# Patient Record
Sex: FEMALE | Race: WHITE | Employment: PART TIME | URBAN - METROPOLITAN AREA
[De-identification: names, ages, dates, MRNs, and addresses within clinical notes are randomized per-mention and may not be internally consistent; named-entity substitution may affect disease eponyms.]

---

## 2017-02-14 ENCOUNTER — TELEPHONE (OUTPATIENT)
Dept: INTERNAL MEDICINE CLINIC | Age: 37
End: 2017-02-14

## 2017-03-16 ENCOUNTER — OFFICE VISIT (OUTPATIENT)
Dept: INTERNAL MEDICINE CLINIC | Age: 37
End: 2017-03-16

## 2017-03-16 VITALS
BODY MASS INDEX: 27.18 KG/M2 | RESPIRATION RATE: 16 BRPM | HEART RATE: 60 BPM | DIASTOLIC BLOOD PRESSURE: 70 MMHG | WEIGHT: 153.4 LBS | HEIGHT: 63 IN | SYSTOLIC BLOOD PRESSURE: 110 MMHG

## 2017-03-16 DIAGNOSIS — G43.909 MIGRAINE WITHOUT STATUS MIGRAINOSUS, NOT INTRACTABLE, UNSPECIFIED MIGRAINE TYPE: ICD-10-CM

## 2017-03-16 DIAGNOSIS — Z00.00 ANNUAL PHYSICAL EXAM: Primary | ICD-10-CM

## 2017-03-16 DIAGNOSIS — F41.9 ANXIETY: ICD-10-CM

## 2017-03-16 PROCEDURE — 99395 PREV VISIT EST AGE 18-39: CPT | Performed by: INTERNAL MEDICINE

## 2017-03-16 ASSESSMENT — PATIENT HEALTH QUESTIONNAIRE - PHQ9
2. FEELING DOWN, DEPRESSED OR HOPELESS: 0
1. LITTLE INTEREST OR PLEASURE IN DOING THINGS: 0
SUM OF ALL RESPONSES TO PHQ9 QUESTIONS 1 & 2: 0
SUM OF ALL RESPONSES TO PHQ QUESTIONS 1-9: 0

## 2017-03-16 ASSESSMENT — ENCOUNTER SYMPTOMS
RESPIRATORY NEGATIVE: 1
EYES NEGATIVE: 1

## 2018-04-02 RX ORDER — SUMATRIPTAN 50 MG/1
TABLET, FILM COATED ORAL
Qty: 9 TABLET | Refills: 5 | Status: SHIPPED | OUTPATIENT
Start: 2018-04-02 | End: 2019-04-01 | Stop reason: SDUPTHER

## 2018-04-26 ENCOUNTER — OFFICE VISIT (OUTPATIENT)
Dept: INTERNAL MEDICINE CLINIC | Age: 38
End: 2018-04-26

## 2018-04-26 VITALS
WEIGHT: 170.6 LBS | RESPIRATION RATE: 16 BRPM | HEART RATE: 60 BPM | DIASTOLIC BLOOD PRESSURE: 80 MMHG | BODY MASS INDEX: 30.23 KG/M2 | SYSTOLIC BLOOD PRESSURE: 130 MMHG | HEIGHT: 63 IN

## 2018-04-26 DIAGNOSIS — Z00.00 ANNUAL PHYSICAL EXAM: Primary | ICD-10-CM

## 2018-04-26 DIAGNOSIS — F41.9 ANXIETY: ICD-10-CM

## 2018-04-26 PROCEDURE — 99395 PREV VISIT EST AGE 18-39: CPT | Performed by: INTERNAL MEDICINE

## 2018-04-26 RX ORDER — VALACYCLOVIR HYDROCHLORIDE 1 G/1
TABLET, FILM COATED ORAL
Qty: 12 TABLET | Refills: 5 | Status: SHIPPED | OUTPATIENT
Start: 2018-04-26 | End: 2020-07-07

## 2018-04-26 ASSESSMENT — ENCOUNTER SYMPTOMS
WHEEZING: 0
BLOOD IN STOOL: 0
RESPIRATORY NEGATIVE: 1
COUGH: 0
SHORTNESS OF BREATH: 0

## 2018-04-26 ASSESSMENT — PATIENT HEALTH QUESTIONNAIRE - PHQ9
SUM OF ALL RESPONSES TO PHQ QUESTIONS 1-9: 0
2. FEELING DOWN, DEPRESSED OR HOPELESS: 0
SUM OF ALL RESPONSES TO PHQ9 QUESTIONS 1 & 2: 0
1. LITTLE INTEREST OR PLEASURE IN DOING THINGS: 0

## 2018-04-28 LAB — VZV IGG SER QL IA: 60 IV

## 2018-05-10 ENCOUNTER — NURSE ONLY (OUTPATIENT)
Dept: INTERNAL MEDICINE CLINIC | Age: 38
End: 2018-05-10

## 2018-05-10 DIAGNOSIS — Z23 NEED FOR VARICELLA VACCINE: Primary | ICD-10-CM

## 2018-05-10 LAB
CONTROL: NORMAL
PREGNANCY TEST URINE, POC: NORMAL

## 2018-05-10 PROCEDURE — 81025 URINE PREGNANCY TEST: CPT | Performed by: INTERNAL MEDICINE

## 2018-05-10 PROCEDURE — 90736 HZV VACCINE LIVE SUBQ: CPT | Performed by: INTERNAL MEDICINE

## 2018-05-10 PROCEDURE — 90471 IMMUNIZATION ADMIN: CPT | Performed by: INTERNAL MEDICINE

## 2018-05-22 LAB
CHOLESTEROL, TOTAL: 171 MG/DL
CHOLESTEROL/HDL RATIO: NORMAL
HDLC SERPL-MCNC: 37 MG/DL (ref 35–70)
LDL CHOLESTEROL CALCULATED: 100 MG/DL (ref 0–160)
TRIGL SERPL-MCNC: 171 MG/DL
VLDLC SERPL CALC-MCNC: NORMAL MG/DL

## 2018-06-14 ENCOUNTER — NURSE ONLY (OUTPATIENT)
Dept: INTERNAL MEDICINE CLINIC | Age: 38
End: 2018-06-14

## 2018-06-14 DIAGNOSIS — Z23 NEED FOR VARICELLA VACCINE: Primary | ICD-10-CM

## 2018-06-14 LAB
CONTROL: PRESENT
PREGNANCY TEST URINE, POC: NEGATIVE

## 2018-06-14 PROCEDURE — 90471 IMMUNIZATION ADMIN: CPT | Performed by: INTERNAL MEDICINE

## 2018-06-14 PROCEDURE — 81025 URINE PREGNANCY TEST: CPT | Performed by: INTERNAL MEDICINE

## 2018-06-14 PROCEDURE — 90716 VAR VACCINE LIVE SUBQ: CPT | Performed by: INTERNAL MEDICINE

## 2018-06-25 ENCOUNTER — TELEPHONE (OUTPATIENT)
Dept: INTERNAL MEDICINE CLINIC | Age: 38
End: 2018-06-25

## 2018-06-25 DIAGNOSIS — R76.0 HIGH SERUM VARICELLA ZOSTER VIRUS (VZV) ANTIBODY TITER: Primary | ICD-10-CM

## 2019-04-02 RX ORDER — SUMATRIPTAN 50 MG/1
TABLET, FILM COATED ORAL
Qty: 18 TABLET | Refills: 4 | Status: SHIPPED | OUTPATIENT
Start: 2019-04-02 | End: 2020-07-07

## 2019-06-04 NOTE — TELEPHONE ENCOUNTER
Called patient and left a VM message stating that an appointment is due before more refills will be granted.

## 2020-08-27 ENCOUNTER — HOSPITAL ENCOUNTER (OUTPATIENT)
Dept: WOMENS IMAGING | Age: 40
Discharge: HOME | End: 2020-08-27
Payer: COMMERCIAL

## 2020-08-27 PROCEDURE — 77063 BREAST TOMOSYNTHESIS BI: CPT

## 2020-12-03 ENCOUNTER — OFFICE VISIT (OUTPATIENT)
Dept: ORTHOPEDIC SURGERY | Age: 40
End: 2020-12-03
Payer: COMMERCIAL

## 2020-12-03 VITALS — HEIGHT: 63 IN | BODY MASS INDEX: 27.29 KG/M2 | TEMPERATURE: 96.9 F | WEIGHT: 154 LBS | RESPIRATION RATE: 12 BRPM

## 2020-12-03 PROCEDURE — 99202 OFFICE O/P NEW SF 15 MIN: CPT | Performed by: ORTHOPAEDIC SURGERY

## 2020-12-03 RX ORDER — PRENATAL VIT/IRON FUM/FOLIC AC 27MG-0.8MG
1 TABLET ORAL DAILY
COMMUNITY
End: 2022-10-31

## 2020-12-03 ASSESSMENT — ENCOUNTER SYMPTOMS
RESPIRATORY NEGATIVE: 1
ALLERGIC/IMMUNOLOGIC NEGATIVE: 1
EYES NEGATIVE: 1
NAUSEA: 1

## 2020-12-03 NOTE — PROGRESS NOTES
Subjective:      Patient ID: Roxene Babinski is a 47 y.o. female. HPI  Roxene Babinski is seen today for evaluation of left shoulder pain. In September she was digging a hole for a new hot tub and felt severe, immediate, excruciating pain in the left shoulder. Her pain is improved with rest.  It is now 2 out of 10 when it used to be 7 out of 10. She still gets some pain with pulling activities such as pulling up her pants. She recently found out she was pregnant so she cannot take anti-inflammatory. She is right-hand dominant. She is otherwise healthy. Review of Systems   Constitutional: Negative. HENT: Negative. Eyes: Negative. Respiratory: Negative. Cardiovascular: Negative. Gastrointestinal: Positive for nausea. Endocrine: Negative. Genitourinary: Negative. Musculoskeletal: Negative. Skin: Negative. Allergic/Immunologic: Negative. Neurological: Positive for headaches. Frequent migraines   Hematological: Bruises/bleeds easily. Psychiatric/Behavioral: Negative. Objective:   Physical Exam  History: Patient's relevant past family, medical, and social history are reviewed as part of today's visit. ROS of pertinent positives and negatives as above; otherwise negative. General Exam:    Vitals: Temperature 96.9 °F (36.1 °C), temperature source Infrared, resp. rate 12, height 5' 3\" (1.6 m), weight 154 lb (69.9 kg), not currently breastfeeding. Constitutional: Patient is adequately groomed with no evidence of malnutrition  Mental Status: The patient is oriented to time, place and person. The patient's mood and affect are appropriate. Gait:  Patient walks with normal gait and station. Lymphatic: The lymphatic examination bilaterally reveals all areas to be without enlargement or induration. Vascular: Examination reveals no swelling or calf tenderness. Peripheral pulses are palpable and 2+. Neurological: The patient has good coordination.   There is no weakness or sensory deficit. Skin:    Head/Neck: inspection reveals no rashes, ulcerations or lesions. Trunk:  inspection reveals no rashes, ulcerations or lesions. Right Lower Extremity: inspection reveals no rashes, ulcerations or lesions. Left Lower Extremity: inspection reveals no rashes, ulcerations or lesions. Examination of the cervical spine reveals no restriction in motion. There are no reproduction of symptoms into either arm with flexion, extension, rotation or palpation. The patient has a negative Spurling sign, and no tenderness. Examination of the right shoulder reveals normal scapular control and no prominence. There is no pain over the acromioclavicular or sternoclavicular joints. The patient has no biceps pain. There is full range of motion. There is no pain with impingement testing. There is no pain with Lemos maneuver. Marshall's maneuver is normal.  There is no pain or apprehension in the abducted externally rotated position. There is no sulcus sign. There is no instability with anterior or posterior stress applied. The patient demonstrates full strength in the supraspinatus, infraspinatus, and subscapularis. Neurologic and vascular examination of the upper extremity  is normal.    Left shoulder exam has some tenderness anteriorly over the long head biceps and subscapularis. Neurologic and vascular exams are normal.  She has no instability. She has mild tightness in internal rotation. He has no pain with impingement maneuver but mild pain to moderate pain with Lemos maneuver. She maintains full strength in the cuff but has discomfort stressing it in all planes. Because she is in the first trimester of her pregnancy I did not obtain x-rays today. Assessment:      Left shoulder strain      Plan:      Because she is pregnant, I do not feel comfortable providing injections or medications.   I am recommending evaluation by the therapist with an emphasis on a

## 2020-12-08 ENCOUNTER — HOSPITAL ENCOUNTER (OUTPATIENT)
Dept: PHYSICAL THERAPY | Age: 40
Setting detail: THERAPIES SERIES
Discharge: HOME | End: 2020-12-08
Payer: COMMERCIAL

## 2020-12-08 PROCEDURE — 97161 PT EVAL LOW COMPLEX 20 MIN: CPT | Performed by: PHYSICAL THERAPIST

## 2020-12-08 PROCEDURE — 97110 THERAPEUTIC EXERCISES: CPT | Performed by: PHYSICAL THERAPIST

## 2020-12-08 PROCEDURE — 97530 THERAPEUTIC ACTIVITIES: CPT | Performed by: PHYSICAL THERAPIST

## 2020-12-08 NOTE — PLAN OF CARE
[facility] [address], [address] [place], [address]  Phone: [phone]   Fax: [phone]          Physical Therapy Certification    Dear Referring Practitioner: Rohan Bryan,    We had the pleasure of evaluating the following patient for physical therapy services at [address] [place] [address]. A summary of our findings can be found in the initial assessment below. This includes our plan of care. If you have any questions or concerns regarding these findings, please do not hesitate to contact me at the office phone number checked above. Thank you for the referral.       Physician Signature:_______________________________Date:__________________  By signing above (or electronic signature), therapists plan is approved by physician      Patient: Clau Peterson   : 1980   MRN: [id number]  Referring Physician: Referring Practitioner: Mahesh      Evaluation Date: 2020      Medical Diagnosis Information:  Diagnosis: left shoulder scapular dyskinesis and IR tightness; left shoulder pain M25.512   Treatment Diagnosis: left shoulder pain-M25.512                                           Precautions/ Contra-indications: anxiety- PCP is aware, depression- PCP is aware, currently pregnant, migraines that are stress induced or hormonal.  C-SSRS Triggered by Intake questionnaire (Past 2 wk assessment):   [x] No, Questionnaire did not trigger screening.   [] Yes, Patient intake triggered further evaluation      [] C-SSRS Screening completed  [] PCP notified via Plan of Care  [] Emergency services notified   Latex Allergy:  [x]NO      []YES  Preferred Language for Healthcare:   [x]English       []other:    SUBJECTIVE: Patient stated complaint: Pt reports hurting her shoulder in September. She was landscaping as they got a hot tub and digging. She had immediate pain. She rested it, and it seemed to get better.   However, recently, her pain got a lot worse. She has pain with pulling on a pair of pants, reaching behind her back, and shutting a door. Pt is RHD. It was bothering her to sleep. She sleeps on that shoulder too. Relevant Medical History: anxiety- PCP is aware, depression- PCP is aware, currently pregnant (6 weeks), migraines that are stress induced or hormonal.  Functional Scale: UEFI-86.25%    Pain Scale: 2-3/10  Easing factors: rest, exercises- 1# wt exercises, Advil before. She was given a steroid, but she found out she was pregnant, and she didn't take it. Pain at best 2-3/10  Provocative factors: 8/10 a couple weeks ago, but recently 5/10. She has pain with pulling on a pair of pants, reaching behind her back, and shutting a door. 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 Living Status/Prior Level of Function: Independent with ADLs and IADLs,  Spinning 2-3 d/wk with some arm, some light strength 1x/wk, run/walk. She normally works out 3-5 d/wk.     (insert highest prior level of function)    OBJECTIVE:     CERV ROM All WNL bilaterally without radiating symptoms    Cervical Flexion     Cervical Extension     Cervical SB     Cervical rotation         ROM PROM AROM  Comment    L R L R    Flexion   165 pain 176    Abduction   180 185    ER   68 98    IR   62 49    Other        Other             Strength L R Comment   Flexion 4- pain 4+    Abduction 4- pain 4+    ER 4- pain 4    IR 4 4+    Supraspinatus      Upper Trap      Lower Trap      Mid Trap      Rhomboids      Biceps 4+ 4+    Triceps      Horizontal Abduction      Horizontal Adduction      Lats Special Tests Left Right   Apley Scratch IR:  ER:   Cross body: IR:  ER:  Cross Body:   Neer's     Full Can     Empty Can     Maxi Platts Positive. Along with CBA    Nerve Tension Testing     Speed's Positive     Sharma's      Spurling's     Repeated Scaption                Reflexes/Sensation (myotomes/dermatomes): sensation intact to light touch    Joint mobility:    []Normal    [x]Hypo   []Hyper    Palpation: TTP over biceps insertion    Functional Mobility/Transfers: see above    Posture: FWD head, scapular protraction    Bandages/Dressings/Incisions: NA    Gait:  WNL    Orthopedic Special Tests: see above                       [x] Patient history, allergies, meds reviewed. Medical chart reviewed. See intake form. Review Of Systems (ROS):  [x]Performed Review of systems (Integumentary, CardioPulmonary, Neurological) by intake and observation. Intake form has been scanned into medical record. Patient has been instructed to contact their primary care physician regarding ROS issues if not already being addressed at this time.       Co-morbidities/Complexities (which will affect course of rehabilitation):   []None           Arthritic conditions   []Rheumatoid arthritis (M05.9)  []Osteoarthritis (M19.91)   Cardiovascular conditions   []Hypertension (I10)  []Hyperlipidemia (E78.5)  []Angina pectoris (I20)  []Atherosclerosis (I70)   Musculoskeletal conditions   []Disc pathology   []Congenital spine pathologies   []Prior surgical intervention  []Osteoporosis (M81.8)  []Osteopenia (M85.8)   Endocrine conditions   []Hypothyroid (E03.9)  []Hyperthyroid Gastrointestinal conditions   []Constipation (U10.56)   Metabolic conditions   []Morbid obesity (E66.01)  []Diabetes type 1(E10.65) or 2 (E11.65)   []Neuropathy (G60.9)     Pulmonary conditions   []Asthma (J45)  []Coughing   []COPD (J44.9)   Psychological Disorders  [x]Anxiety (F41.9)  [x]Depression (F32.9)   []Other:   [x]Other: currently pregnant       Barriers to/and or personal factors that will affect rehab potential:              []Age  []Sex              []Motivation/Lack of Motivation                        []Co-Morbidities              []Cognitive Function, education/learning barriers              []Environmental, home barriers              [x]profession/work barriers  []past PT/medical experience  []other:  Justification: Pt has had shoulder pain for months. It did get better, but it returned and was significant. She is currently pregnant and has to avoid certain tx options. She does sit for the vast majority of the day when she is working, which may contribute to her posture. We did discuss this and ways to work on her posture when working. Falls Risk Assessment (30 days):   [x] Falls Risk assessed and no intervention required.   [] Falls Risk assessed and Patient requires intervention due to being higher risk   TUG score (>12s at risk):     [] Falls education provided, including           ASSESSMENT:   Functional Impairments   []Noted spinal or UE joint hypomobility   []Noted spinal or UE joint hypermobility   [x]Decreased UE functional ROM   [x]Decreased UE functional strength   []Abnormal reflexes/sensation/myotomal/dermatomal deficits   [x]Decreased RC/scapular/core strength and neuromuscular control   []other:      Functional Activity Limitations (from functional questionnaire and intake)   [x]Reduced ability to tolerate prolonged functional positions   []Reduced ability or difficulty with changes of positions or transfers between positions   [x]Reduced ability to maintain good posture and demonstrate good body mechanics with sitting, bending, and lifting   [] Reduced ability or tolerance with driving and/or computer work   [x]Reduced ability to sleep   [x]Reduced ability to perform lifting, reaching, carrying tasks   []Reduced ability to tolerate impact through UE   [x]Reduced ability to reach behind back   []Reduced ability to  or hold objects   []Reduced ability to throw or toss an object   []other:    Participation Restrictions   [x]Reduced participation in self care activities   [x]Reduced participation in home management activities   []Reduced participation in work activities   []Reduced participation in social activities. [x]Reduced participation in sport/recreation activities. Classification:   []Signs/symptoms consistent with post-surgical status including decreased ROM, strength and function. []Signs/symptoms consistent with joint sprain/strain   []Signs/symptoms consistent with shoulder impingement   []Signs/symptoms consistent with shoulder/elbow/wrist tendinopathy   []Signs/symptoms consistent with Rotator cuff tear   []Signs/symptoms consistent with labral tear   []Signs/symptoms consistent with postural dysfunction    []Signs/symptoms consistent with Glenohumeral IR Deficit - <45 degrees   []Signs/symptoms consistent with facet dysfunction of cervical/thoracic spine    []Signs/symptoms consistent with pathology which may benefit from Dry needling     []other: Pt is a 48 y/o female presenting with diagnosis of left scapular dyskinesis and impingement from the MD.  Clinically, the pt presents with decreased ROM, decreased strength, decreased function, and increased pain consistent with the MD diagnosis. The pt would benefit from skilled PT to return to PLOF. Pt has had shoulder pain for months. It did get better, but it returned and was significant. She does demo some biceps irritation. She is currently pregnant and has to avoid certain tx options. She does sit for the vast majority of the day when she is working, which may contribute to her posture. We did discuss this and ways to work on her posture when working. We did review insurance benefits. All of pt's questions were answered. Pt was agreeable.         Prognosis/Rehab Potential:      []Excellent   [x]Good    []Fair   []Poor    Tolerance of evaluation/treatment: []Excellent   [x]Good    []Fair   []Poor    PLAN  Frequency/Duration:  1-2 days per week for 4-6 Weeks:  Interventions:  [x]  Therapeutic exercise including: strength training, ROM, for Lower extremity and core   [x]  NMR activation and proprioception for LE, Glutes and Core   [x]  Manual therapy as indicated for LE, Hip and spine to include: Dry Needling/IASTM, STM, PROM, Gr I-IV mobilizations, manipulation. [x] Modalities as needed that may include: thermal agents, E-stim, Biofeedback, US, iontophoresis as indicated  [x] Patient education on joint protection, postural re-education, activity modification, progression of HEP. HEP instruction: (see scanned forms)      GOALS:   Patient stated goal: have full use of the shoulder without pain    [] Progressing: [] Met: [] Not Met: [] Adjusted    Therapist goals for Patient:   Short Term Goals: To be achieved in: 2 weeks  1. Independent in HEP and progression per patient tolerance, in order to prevent re-injury. [] Progressing: [] Met: [] Not Met: [] Adjusted   2. Patient will report pain at worst less than or equal to 5/10. [] Progressing: [] Met: [] Not Met: [] Adjusted    Long Term Goals: To be achieved in: 6 weeks  1. Pt will demo UEFI of 90% or better to assist with reaching prior level of function. [] Progressing: [] Met: [] Not Met: [] Adjusted  2. Patient will demonstrate increased AROM to shoulder flex greater than or equal to 180, ABD greater than or equal to 180, ER greater than or equal to 80, IR greater than or equal to 60 to allow for proper joint functioning as indicated by patients Functional Deficits. [] Progressing: [] Met: [] Not Met: [] Adjusted  3. Patient will demonstrate an increase in strength to shoulder flex greater than or equal to 4, ABD greater than or equal to 4, ER greater than or equal to 4, IR greater than or equal to 4 to allow for proper functional mobility as indicated by patients functional deficits.    []

## 2020-12-08 NOTE — FLOWSHEET NOTE
Orthopaedics and [address] [place] [address] DR YOLI BRIGHT                   Physical Therapy Treatment Note/ Progress Report:           Date:  2020    Patient Name:  Aisha Knight    :  1980  MRN: [id number]  Restrictions/Precautions:    Medical/Treatment Diagnosis Information:  · Diagnosis: left shoulder scapular dyskinesis and IR tightness; left shoulder pain M25.512. Onset- 2020  · Treatment Diagnosis: left shoulder [id number]  Insurance/Certification information:  PT Insurance Information: [employer]  Physician Information:  Referring Practitioner: Larissa Rivas  Has the plan of care been signed (Y/N):        []  Yes  [x]  No     Date of Patient follow up with Physician: prn      Is this a Progress Report:     []  Yes  [x]  No        If Yes:  Date Range for reporting period:  Beginning 2020  Ending    Progress report will be due (10 Rx or 30 days whichever is less): visit 10 or 3147      Recertification will be due (POC Duration  / 90 days whichever is less): see above        Visit # Insurance Allowable Auth Required   1 40 []  Yes []  No        Functional Scale:  UEFI-86.25%   Date assessed: 2020    Latex Allergy:  [x]NO      []YES  Preferred Language for Healthcare:   [x]English       []other:    Pain level:  See eval     SUBJECTIVE:  See eval    OBJECTIVE: See eval   Observation:    Test measurements:      ROM PROM AROM  Comment    L R L R    Flexion        Abduction        ER        IR        Other        Other             Strength L R Comment   Flexion      Abduction      ER      IR      Supraspinatus      Upper Trap      Lower Trap      Mid Trap      Rhomboids      Biceps      Triceps      Horizontal Abduction      Horizontal Adduction      Lats          RESTRICTIONS/PRECAUTIONS: anxiety- PCP is aware, depression- PCP is aware, currently pregnant, migraines that are stress induced or hormonal.      Exercises/Interventions:   Exercise/Equipment Resistance/Repetitions Other comments   Aerobic Conditioning     Aerodyne          Stretching/PROM     Wand 10x:10 Flex supine   Table Slides     Wall slides      UE [place] 10x:10 Cane ER at 80   Pulleys     Pendulum     BB IR 10x:10    SL IR     Pec doorway stretch     CBA stretch     UT stretch     LS stretch     Isometrics     Retraction          Weight shift     Flexion     Abduction     External Rotation     Internal Rotation     Biceps     Triceps          PRE's     Flexion     Abduction     External Rotation     Internal Rotation     Shrugs     EXT     Reverse Flys     Serratus 3x10    Horizontal Abd with ER     Biceps     Triceps     Retraction          Cable Column/Theraband     External Rotation     Internal Rotation     Shrugs     Lats     Ext 3x10 red    Flex     Scapular Retraction 3x10 red    BIC     TRIC     PNF          Dynamic Stability          [place]            Access Code: [email or web address]   URL: [email or web address]   Date: 12/08/2020   Prepared by: Monica Mayen     Exercises  Supine Shoulder Flexion Extension AAROM with Dowel - 10 reps - 10 hold - 2x daily - 7x weekly  Supine Shoulder External Rotation in 45 Degrees Abduction AAROM with Dowel - 10 reps - 10 hold - 2x daily - 7x weekly  Standing Shoulder Internal Rotation Stretch with Towel - 10 reps - 10 hold - 2x daily - 7x weekly  Supine Scapular Protraction in Flexion with Dumbbells - 10 reps - 3 sets - 1-2x daily - 7x weekly  Scapular Retraction with Resistance - 10 reps - 3 sets - 1-2x daily - 7x weekly  Shoulder Extension with Resistance - 10 reps - 3 sets - 1-2x daily - 7x weekly      Therapeutic Exercise and NMR EXR  [x] (75843) Provided verbal/tactile cueing for activities related to strengthening, flexibility, endurance, ROM for improvements in LE, proximal hip, and core control with self care, mobility, lifting, ambulation.   [x] (00164) Provided verbal/tactile cueing for activities related to improving balance, coordination, kinesthetic sense, x1      [] [facility] (34494)  [] ES(attended) (73348)      [] ES (un) (92972):       GOALS:   Patient stated goal: have full use of the shoulder without pain    []? Progressing: []? Met: []? Not Met: []? Adjusted     Therapist goals for Patient:   Short Term Goals: To be achieved in: 2 weeks  1. Independent in HEP and progression per patient tolerance, in order to prevent re-injury. []? Progressing: []? Met: []? Not Met: []? Adjusted   2. Patient will report pain at worst less than or equal to 5/10. []? Progressing: []? Met: []? Not Met: []? Adjusted     Long Term Goals: To be achieved in: 6 weeks  1. Pt will demo UEFI of 90% or better to assist with reaching prior level of function. []? Progressing: []? Met: []? Not Met: []? Adjusted  2. Patient will demonstrate increased AROM to shoulder flex greater than or equal to 180, ABD greater than or equal to 180, ER greater than or equal to 80, IR greater than or equal to 60 to allow for proper joint functioning as indicated by patients Functional Deficits. []? Progressing: []? Met: []? Not Met: []? Adjusted  3. Patient will demonstrate an increase in strength to shoulder flex greater than or equal to 4, ABD greater than or equal to 4, ER greater than or equal to 4, IR greater than or equal to 4 to allow for proper functional mobility as indicated by patients functional deficits. []? Progressing: []? Met: []? Not Met: []? Adjusted  4. Patient will return to performing all self care without increased symptoms or restriction. []? Progressing: []? Met: []? Not Met: []? Adjusted  5. Pt will report pain at worst less than or equal to 2/10.  (patient specific functional goal)    []? Progressing: []? Met: []? Not Met: []? Adjusted      Overall Progression Towards Functional goals/ Treatment Progress Update:  [] Patient is progressing as expected towards functional goals listed. [] Progression is slowed due to complexities/Impairments listed.   [] Progression

## 2020-12-16 ENCOUNTER — HOSPITAL ENCOUNTER (OUTPATIENT)
Dept: PHYSICAL THERAPY | Age: 40
Setting detail: THERAPIES SERIES
Discharge: HOME | End: 2020-12-16
Payer: COMMERCIAL

## 2020-12-16 PROCEDURE — 97530 THERAPEUTIC ACTIVITIES: CPT | Performed by: PHYSICAL THERAPIST

## 2020-12-16 PROCEDURE — 97110 THERAPEUTIC EXERCISES: CPT | Performed by: PHYSICAL THERAPIST

## 2020-12-16 PROCEDURE — 97112 NEUROMUSCULAR REEDUCATION: CPT | Performed by: PHYSICAL THERAPIST

## 2020-12-16 NOTE — FLOWSHEET NOTE
Orthopaedics and [address] [place] [address] DR YOLI BRIGHT                   Physical Therapy Treatment Note/ Progress Report:           Date:  2020    Patient Name:  Malvin Vasquez    :  1980  MRN: [id number]  Restrictions/Precautions:    Medical/Treatment Diagnosis Information:  · Diagnosis: left shoulder scapular dyskinesis and IR tightness; left shoulder pain M25.512. Onset- 2020  · Treatment Diagnosis: left shoulder [id number]  Insurance/Certification information:  PT Insurance Information: [employer]  Physician Information:  Referring Practitioner: Libia Campbell  Has the plan of care been signed (Y/N):        []  Yes  [x]  No     Date of Patient follow up with Physician: prn      Is this a Progress Report:     []  Yes  [x]  No        If Yes:  Date Range for reporting period:  Beginning 2020  Ending    Progress report will be due (10 Rx or 30 days whichever is less): visit 10 or 5010      Recertification will be due (POC Duration  / 90 days whichever is less): see above        Visit # Insurance Allowable Auth Required   2 40 []  Yes []  No        Functional Scale: UEFI-86.25%   Date assessed: 2020    Latex Allergy:  [x]NO      []YES  Preferred Language for Healthcare:   [x]English       []other:    Pain level:  0/10    SUBJECTIVE:  Right now the highest her pain goes is 5/10. This is caused with reaching behind her back. The ER stretch is also difficult.      OBJECTIVE: See eval   Observation:    Test measurements:      ROM PROM AROM  Comment    L R L R    Flexion   179     Abduction        ER   78     IR        Other        Other             Strength L R Comment   Flexion      Abduction      ER      IR      Supraspinatus      Upper Trap      Lower Trap      Mid Trap      Rhomboids      Biceps      Triceps      Horizontal Abduction      Horizontal Adduction      Lats          RESTRICTIONS/PRECAUTIONS: anxiety- PCP is aware, depression- PCP is aware, currently pregnant, migraines that are stress induced or hormonal.      Exercises/Interventions:   Exercise/Equipment Resistance/Repetitions Other comments   Aerobic Conditioning     Aerodyne          Stretching/PROM     Wand 10x:10 Flex supine   Table Slides     Wall slides      UE [place] 10x:10 Cane ER at 80   Pulleys     Pendulum     BB IR 10x:10    SL IR 10x:10    Pec doorway stretch     CBA stretch     UT stretch     LS stretch     Isometrics     Retraction          Weight shift     Flexion     Abduction     External Rotation     Internal Rotation     Biceps     Triceps          PRE's     Flexion 10x:10    Abduction 10x:10    External Rotation 10x:10    Internal Rotation 10x:10    Shrugs     EXT     Reverse Flys     Serratus 3x10 2#    Horizontal Abd with ER     Biceps     Triceps     Retraction     UK deltoid 5' Painfree motion             Cable Column/Theraband     External Rotation     Internal Rotation     Shrugs     Lats     Ext 3x10 red    Flex     Scapular Retraction 3x10 red    BIC     TRIC     PNF          Dynamic Stability          [employer]            Access Code: [email or web address]   URL: [email or web address]   Date: 12/16/2020   Prepared by: Ankush Villedana     Exercises  Supine Shoulder Flexion Extension AAROM with Dowel - 10 reps - 10 hold - 2x daily - 7x weekly  Supine Shoulder External Rotation in 45 Degrees Abduction AAROM with Dowel - 10 reps - 10 hold - 2x daily - 7x weekly  Sleeper Stretch - 10 reps - 10 hold - 2x daily - 7x weekly  Standing Shoulder Internal Rotation Stretch with Towel - 10 reps - 10 hold - 2x daily - 7x weekly  Supine Scapular Protraction in Flexion with Dumbbells - 10 reps - 3 sets - 1-2x daily - 7x weekly  Scapular Retraction with Resistance - 10 reps - 3 sets - 1-2x daily - 7x weekly  Shoulder Extension with Resistance - 10 reps - 3 sets - 1-2x daily - 7x weekly  Supine Shoulder Flexion AAROM with Hands Clasped - 2x daily - 7x weekly  Isometric Shoulder Flexion at Wall - 10 reps - 10 hold - 1x daily - 3-7x weekly  Isometric Shoulder Abduction at Wall - 10 reps - 10 hold - 1x daily                            - 3-7x weekly  Standing Isometric Shoulder Internal Rotation at Doorway - 10 reps - 10 hold - 1x daily - 3-7x weekly  Standing Isometric Shoulder External Rotation with Doorway - 10 reps - 10 hold - 1x daily - 3-7x weekly    Therapeutic Exercise and NMR EXR  [x] (70196) Provided verbal/tactile cueing for activities related to strengthening, flexibility, endurance, ROM for improvements in LE, proximal hip, and core control with self care, mobility, lifting, ambulation. [x] (84855) Provided verbal/tactile cueing for activities related to improving balance, coordination, kinesthetic sense, posture, motor skill, proprioception  to assist with LE, proximal hip, and core control in self care, mobility, lifting, ambulation and eccentric single leg control.      NMR and Therapeutic Activities:    [x] (37501 or 64472) Provided verbal/tactile cueing for activities related to improving balance, coordination, kinesthetic sense, posture, motor skill, proprioception and motor activation to allow for proper function of core, proximal hip and LE with self care and ADLs  [x] (04986) Gait Re-education- Provided training and instruction to the patient for proper LE, core and proximal hip recruitment and positioning and eccentric body weight control with ambulation re-education including up and down stairs     Home Exercise Program:    [x] (63440) Reviewed/Progressed HEP activities related to strengthening, flexibility, endurance, ROM of core, proximal hip and LE for functional self-care, mobility, lifting and ambulation/stair navigation   [x] (46692)Reviewed/Progressed HEP activities related to improving balance, coordination, kinesthetic sense, posture, motor skill, proprioception of core, proximal hip and LE for self care, mobility, lifting, and ambulation/stair navigation      Manual Treatments:  PROM / STM / or equal to 4, IR greater than or equal to 4 to allow for proper functional mobility as indicated by patients functional deficits. []? Progressing: []? Met: []? Not Met: []? Adjusted  4. Patient will return to performing all self care without increased symptoms or restriction. []? Progressing: []? Met: []? Not Met: []? Adjusted  5. Pt will report pain at worst less than or equal to 2/10.  (patient specific functional goal)    []? Progressing: []? Met: []? Not Met: []? Adjusted      Overall Progression Towards Functional goals/ Treatment Progress Update:  [] Patient is progressing as expected towards functional goals listed. [] Progression is slowed due to complexities/Impairments listed. [] Progression has been slowed due to co-morbidities. [x] Plan just implemented, too soon to assess goals progression <30days   [] Goals require adjustment due to lack of progress  [] Patient is not progressing as expected and requires additional follow up with physician  [] Other    Prognosis for POC: [x] Good [] Fair  [] Poor      Patient requires continued skilled intervention: [x] Yes  [] No    Treatment/Activity Tolerance:  [x] Patient able to complete treatment  [] Patient limited by fatigue  [] Patient limited by pain     [] Patient limited by other medical complications  [] Other: Pt meggan tx well. She demo significant improvements in her flexion and ER. We did add some new exercises, which I added to her HEP. We reviewed the frequency of these exercises as well. She demo good comprehension of HEP. I've also given her bands for HEP. Pt would continue to benefit from skilled PT to improve strength, ROM, and function. PLAN: If pt doesn't return, this note can be considered a D/C note. Consider progressing isometrics and bicep ECL.    [x] Continue per plan of care [] Alter current plan (see comments above)  [] Plan of care initiated [] Hold pending MD visit [] Discharge  Electronically signed by: Nicole Fenton Quinton PT, DPT [id number]

## 2020-12-23 ENCOUNTER — HOSPITAL ENCOUNTER (OUTPATIENT)
Dept: PHYSICAL THERAPY | Age: 40
Setting detail: THERAPIES SERIES
Discharge: HOME | End: 2020-12-23
Payer: COMMERCIAL

## 2020-12-23 PROCEDURE — 97530 THERAPEUTIC ACTIVITIES: CPT | Performed by: PHYSICAL THERAPIST

## 2020-12-23 PROCEDURE — 97112 NEUROMUSCULAR REEDUCATION: CPT | Performed by: PHYSICAL THERAPIST

## 2020-12-23 PROCEDURE — 97110 THERAPEUTIC EXERCISES: CPT | Performed by: PHYSICAL THERAPIST

## 2020-12-23 NOTE — FLOWSHEET NOTE
Orthopaedics and [address] [place] [address] DR YOLI BRIGHT                   Physical Therapy Treatment Note/ Progress Report:           Date:  2020    Patient Name:  Julio Cesar Taylor    :  1980  MRN: [id number]  Restrictions/Precautions:    Medical/Treatment Diagnosis Information:  · Diagnosis: left shoulder scapular dyskinesis and IR tightness; left shoulder pain M25.512. Onset- 2020  · Treatment Diagnosis: left shoulder [id number]  Insurance/Certification information:  PT Insurance Information: [employer]  Physician Information:  Referring Practitioner: Funmi Moreno  Has the plan of care been signed (Y/N):        []  Yes  [x]  No     Date of Patient follow up with Physician: prn      Is this a Progress Report:     []  Yes  [x]  No        If Yes:  Date Range for reporting period:  Beginning 2020  Ending    Progress report will be due (10 Rx or 30 days whichever is less): visit 10 or 5820      Recertification will be due (POC Duration  / 90 days whichever is less): see above        Visit # Insurance Allowable Auth Required   3 40 []  Yes []  No        Functional Scale: UEFI-86.25%   Date assessed: 2020    Latex Allergy:  [x]NO      []YES  Preferred Language for Healthcare:   [x]English       []other:    Pain level:  3-4/10    SUBJECTIVE:  On Saturday, she tried to lift a box overhead that was really heavy. She should have had her  help, but she didn't want to wait. She had pain right away including with pulling on her pants. Her pain has gotten better. Saturday was really bad (7-8/10 pain).     OBJECTIVE: 2020    Observation:    Test measurements:      ROM PROM AROM  Comment    L R L R    Flexion   180     Abduction        ER   82     IR        Other        Other             Strength L R Comment   Flexion      Abduction      ER      IR      Supraspinatus      Upper Trap      Lower Trap      Mid Trap      Rhomboids      Biceps      Triceps      Horizontal Abduction with Resistance - 10 reps - 3 sets - 1-2x daily - 7x weekly  Supine Shoulder Flexion AAROM with Hands Clasped - 2x daily - 7x weekly  Isometric Shoulder Flexion at Wall - 10 reps - 10 hold - 1x daily - 3-7x weekly  Isometric Shoulder Abduction at Wall - 10 reps - 10 hold - 1x daily                            - 3-7x weekly  Standing Isometric Shoulder Internal Rotation at Doorway - 10 reps - 10 hold - 1x daily - 3-7x weekly  Standing Isometric Shoulder External Rotation with Doorway - 10 reps - 10 hold - 1x daily - 3-7x weekly    Therapeutic Exercise and NMR EXR  [x] (33083) Provided verbal/tactile cueing for activities related to strengthening, flexibility, endurance, ROM for improvements in LE, proximal hip, and core control with self care, mobility, lifting, ambulation. [x] (11786) Provided verbal/tactile cueing for activities related to improving balance, coordination, kinesthetic sense, posture, motor skill, proprioception  to assist with LE, proximal hip, and core control in self care, mobility, lifting, ambulation and eccentric single leg control.      NMR and Therapeutic Activities:    [x] (99720 or 73691) Provided verbal/tactile cueing for activities related to improving balance, coordination, kinesthetic sense, posture, motor skill, proprioception and motor activation to allow for proper function of core, proximal hip and LE with self care and ADLs  [x] (59207) Gait Re-education- Provided training and instruction to the patient for proper LE, core and proximal hip recruitment and positioning and eccentric body weight control with ambulation re-education including up and down stairs     Home Exercise Program:    [x] (05906) Reviewed/Progressed HEP activities related to strengthening, flexibility, endurance, ROM of core, proximal hip and LE for functional self-care, mobility, lifting and ambulation/stair navigation   [x] (35863)Reviewed/Progressed HEP activities related to improving balance, coordination, Progressing: []? Met: []? Not Met: []? Adjusted  3. Patient will demonstrate an increase in strength to shoulder flex greater than or equal to 4, ABD greater than or equal to 4, ER greater than or equal to 4, IR greater than or equal to 4 to allow for proper functional mobility as indicated by patients functional deficits. []? Progressing: []? Met: []? Not Met: []? Adjusted  4. Patient will return to performing all self care without increased symptoms or restriction. []? Progressing: []? Met: []? Not Met: []? Adjusted  5. Pt will report pain at worst less than or equal to 2/10.  (patient specific functional goal)    []? Progressing: []? Met: []? Not Met: []? Adjusted      Overall Progression Towards Functional goals/ Treatment Progress Update:  [] Patient is progressing as expected towards functional goals listed. [] Progression is slowed due to complexities/Impairments listed. [] Progression has been slowed due to co-morbidities. [x] Plan just implemented, too soon to assess goals progression <30days   [] Goals require adjustment due to lack of progress  [] Patient is not progressing as expected and requires additional follow up with physician  [] Other    Prognosis for POC: [x] Good [] Fair  [] Poor      Patient requires continued skilled intervention: [x] Yes  [] No    Treatment/Activity Tolerance:  [x] Patient able to complete treatment  [] Patient limited by fatigue  [] Patient limited by pain     [] Patient limited by other medical complications  [] Other: Pt meggan tx well. She did report feeling some morning sickness at the start of tx. It did get better as her morning went on. She continues to demo improving ROM. We did discuss why overhead activities may be challenging and cause pain. We discussed how we need to improve her cuff strength for this. She is understanding. Pt would continue to benefit from skilled PT to improve strength, ROM, and function.      PLAN: If pt doesn't return, this note can be considered a D/C note. Consider progressing isometrics and bicep ECL with heavier weight.    [x] Continue per plan of care [] Alter current plan (see comments above)  [] Plan of care initiated [] Hold pending MD visit [] Discharge  Electronically signed by: Khang Cabrera DPT [id number]

## 2020-12-29 ENCOUNTER — HOSPITAL ENCOUNTER (OUTPATIENT)
Dept: PHYSICAL THERAPY | Age: 40
Setting detail: THERAPIES SERIES
Discharge: HOME | End: 2020-12-29
Payer: COMMERCIAL

## 2020-12-29 PROCEDURE — 97112 NEUROMUSCULAR REEDUCATION: CPT | Performed by: PHYSICAL THERAPIST

## 2020-12-29 PROCEDURE — 97110 THERAPEUTIC EXERCISES: CPT | Performed by: PHYSICAL THERAPIST

## 2020-12-29 NOTE — FLOWSHEET NOTE
RESTRICTIONS/PRECAUTIONS: anxiety- PCP is aware, depression- PCP is aware, currently pregnant, migraines that are stress induced or hormonal.      Exercises/Interventions:   Exercise/Equipment Resistance/Repetitions Other comments   Aerobic Conditioning     Aerodyne          Stretching/PROM     Wand Table Slides Wall slides  10 x :10 Flex, Added 12/29   UE [place] 10x:10 Cane ER at 80   Pulleys     Pendulum     BB IR 10x:10    SL IR 10x:10    Pec doorway stretch     CBA stretch     UT stretch     LS stretch     Isometrics     Retraction          Weight shift     Flexion 10x:10    Abduction 10x:10    External Rotation 10x:10    Internal Rotation 10x:10    Biceps     Triceps          PRE's     Flexion     Abduction     External Rotation     Internal Rotation     Shrugs 3x10 4# ^12/29   EXT     Reverse Flys     Serratus 3x10 3# ^12/29   Horizontal Abd with ER     Biceps 3x10 4# ^12/29   Triceps     Retraction     UK deltoid [place] T 10 x 3  Added 12/29        Cable Column/Theraband     External Rotation     Internal Rotation     Shrugs     Lats     Ext 3x10 blue ^12/29   Flex     Scapular Retraction 3x10 blue ^12/29   BIC     TRIC     PNF          Dynamic Stability     Ball on the wall 10 x 3 each, 4-way, kickball Added 12/29        [employer]            Access Code: [email or web address]   URL: [email or web address]   Date: 12/16/2020   Prepared by: Warren Mayen     Exercises  Supine Shoulder Flexion Extension AAROM with Dowel - 10 reps - 10 hold - 2x daily - 7x weekly  Supine Shoulder External Rotation in 45 Degrees Abduction AAROM with Dowel - 10 reps - 10 hold - 2x daily - 7x weekly  Sleeper Stretch - 10 reps - 10 hold - 2x daily - 7x weekly  Standing Shoulder Internal Rotation Stretch with Towel - 10 reps - 10 hold - 2x daily - 7x weekly  Supine Scapular Protraction in Flexion with Dumbbells - 10 reps - 3 sets - 1-2x daily - 7x weekly  Scapular Retraction with Resistance - 10 reps - 3 sets - 1-2x daily - 7x weekly  Shoulder Extension with Resistance - 10 reps - 3 sets - 1-2x daily - 7x weekly  Supine Shoulder Flexion AAROM with Hands Clasped - 2x daily - 7x weekly  Isometric Shoulder Flexion at Wall - 10 reps - 10 hold - 1x daily - 3-7x weekly  Isometric Shoulder Abduction at Wall - 10 reps - 10 hold - 1x daily                            - 3-7x weekly  Standing Isometric Shoulder Internal Rotation at Doorway - 10 reps - 10 hold - 1x daily - 3-7x weekly  Standing Isometric Shoulder External Rotation with Doorway - 10 reps - 10 hold - 1x daily - 3-7x weekly    Therapeutic Exercise and NMR EXR  [x] (20906) Provided verbal/tactile cueing for activities related to strengthening, flexibility, endurance, ROM for improvements in LE, proximal hip, and core control with self care, mobility, lifting, ambulation. [x] (67402) Provided verbal/tactile cueing for activities related to improving balance, coordination, kinesthetic sense, posture, motor skill, proprioception  to assist with LE, proximal hip, and core control in self care, mobility, lifting, ambulation and eccentric single leg control.      NMR and Therapeutic Activities:    [x] (69314 or 53292) Provided verbal/tactile cueing for activities related to improving balance, coordination, kinesthetic sense, posture, motor skill, proprioception and motor activation to allow for proper function of core, proximal hip and LE with self care and ADLs  [x] (53566) Gait Re-education- Provided training and instruction to the patient for proper LE, core and proximal hip recruitment and positioning and eccentric body weight control with ambulation re-education including up and down stairs     Home Exercise Program:    [x] (26579) Reviewed/Progressed HEP activities related to strengthening, flexibility, endurance, ROM of core, proximal hip and LE for functional self-care, mobility, lifting and ambulation/stair navigation   [x] (97719)Reviewed/Progressed HEP activities related to improving balance, coordination, kinesthetic sense, posture, motor skill, proprioception of core, proximal hip and LE for self care, mobility, lifting, and ambulation/stair navigation      Manual Treatments:  PROM / STM / Oscillations-Mobs:  G-I, II, III, IV (PA's, Inf., Post.)  [x] (36672) Provided manual therapy to mobilize LE, proximal hip and/or LS spine soft tissue/joints for the purpose of modulating pain, promoting relaxation,  increasing ROM, reducing/eliminating soft tissue swelling/inflammation/restriction, improving soft tissue extensibility and allowing for proper ROM for normal function with self care, mobility, lifting and ambulation. Modalities:      Charges:   Timed Code Treatment Minutes: 30'    Total Treatment Minutes: 28'      [] EVAL (LOW) 455 1011   [] EVAL (MOD) 00444   [] EVAL (HIGH) 41156   [] RE-EVAL   [x] GY(17566) x   1  [] IONTO  [x] NMR (36304) x  1   [] VASO  [] Manual (59775) x      [] Other:  [] TA       [] Mech Traction (19521)  [] ES(attended) (93654)      [] ES (un) (93337):       GOALS:   Patient stated goal: have full use of the shoulder without pain    []? Progressing: []? Met: []? Not Met: []? Adjusted     Therapist goals for Patient:   Short Term Goals: To be achieved in: 2 weeks  1. Independent in HEP and progression per patient tolerance, in order to prevent re-injury. []? Progressing: []? Met: []? Not Met: []? Adjusted   2. Patient will report pain at worst less than or equal to 5/10. []? Progressing: []? Met: []? Not Met: []? Adjusted     Long Term Goals: To be achieved in: 6 weeks  1. Pt will demo UEFI of 90% or better to assist with reaching prior level of function. []? Progressing: []? Met: []? Not Met: []? Adjusted  2.  Patient will demonstrate increased AROM to shoulder flex greater than or equal to 180, ABD greater than or equal to 180, ER greater than or equal to 80, IR greater than or equal to 60 to allow for proper joint functioning as indicated by patients Functional Deficits. []? Progressing: []? Met: []? Not Met: []? Adjusted  3. Patient will demonstrate an increase in strength to shoulder flex greater than or equal to 4, ABD greater than or equal to 4, ER greater than or equal to 4, IR greater than or equal to 4 to allow for proper functional mobility as indicated by patients functional deficits. []? Progressing: []? Met: []? Not Met: []? Adjusted  4. Patient will return to performing all self care without increased symptoms or restriction. []? Progressing: []? Met: []? Not Met: []? Adjusted  5. Pt will report pain at worst less than or equal to 2/10.  (patient specific functional goal)    []? Progressing: []? Met: []? Not Met: []? Adjusted      Overall Progression Towards Functional goals/ Treatment Progress Update:  [] Patient is progressing as expected towards functional goals listed. [] Progression is slowed due to complexities/Impairments listed. [] Progression has been slowed due to co-morbidities. [x] Plan just implemented, too soon to assess goals progression <30days   [] Goals require adjustment due to lack of progress  [] Patient is not progressing as expected and requires additional follow up with physician  [] Other    Prognosis for POC: [x] Good [] Fair  [] Poor      Patient requires continued skilled intervention: [x] Yes  [] No    Treatment/Activity Tolerance:  [x] Patient able to complete treatment  [] Patient limited by fatigue  [] Patient limited by pain     [] Patient limited by other medical complications  [] Other: Pt meggan session well. She demonstrated increased strength, noted by increased resistance. She required min VCs to reduce upper trap activation with scapular retraction. She reported fatigue with the addition of ball on the wall, but she was able to maintain proper form while completing. Pt would continue to benefit from skilled PT to improve strength, ROM, and function.      PLAN: If pt doesn't return, this note can be considered a D/C note. Consider progressing isometrics and bicep ECL with heavier weight.    [x] Continue per plan of care [] Alter current plan (see comments above)  [] Plan of care initiated [] Hold pending MD visit [] Discharge    Electronically signed by: Andree Grimaldo PT, DPT

## 2021-01-07 ENCOUNTER — HOSPITAL ENCOUNTER (OUTPATIENT)
Dept: PHYSICAL THERAPY | Age: 41
Setting detail: THERAPIES SERIES
Discharge: HOME | End: 2021-01-07
Payer: COMMERCIAL

## 2021-01-07 PROCEDURE — 97110 THERAPEUTIC EXERCISES: CPT | Performed by: PHYSICAL THERAPIST

## 2021-01-07 PROCEDURE — 97530 THERAPEUTIC ACTIVITIES: CPT | Performed by: PHYSICAL THERAPIST

## 2021-01-07 PROCEDURE — 97112 NEUROMUSCULAR REEDUCATION: CPT | Performed by: PHYSICAL THERAPIST

## 2021-01-07 NOTE — PLAN OF CARE
Orthopaedics and [address] [place] [address] DR YOLI BRIGHT                   Physical Therapy Treatment Note/ Progress Report:      Physical Therapy Re-Certification Plan of Care    Dear Dr. Terral Dancer,    We had the pleasure of treating the following patient for physical therapy services at [address] [place] [address]. A summary of our findings can be found in the updated assessment below. This includes our plan of care. If you have any questions or concerns regarding these findings, please do not hesitate to contact me at the office phone number checked above. Thank you for the referral.     Physician Signature:________________________________Date:__________________  By signing above (or electronic signature), therapists plan is approved by physician    Date Range Of Visits:   Total Visits to Date: 5  Overall Response to Treatment:   [x] Patient is responding well to treatment and improvement is noted with regards to goals   [] Patient should continue to improve in reasonable time if they continue HEP   [] Patient has plateaued and is no longer responding to skilled PT intervention    [] Patient is getting worse and would benefit from return to referring MD   [] Patient unable to adhere to initial POC   [] Other: Pt meggan session well. She demo significantly improved ROM and strength. She demo good comprehension of HEP. She meggan progressions well. I did update her HEP. Her pain today is more likely from using her arm more. This was a recent onset of pain that has been improving. We did review differential diagnosis and how her tx may be different if she wasn't pregnant. I reassured her that her tx would be pretty much the same as it is currently. She is understanding. Pt would continue to benefit from skilled PT to improve strength, ROM, and function. Continue tx 1x/wk x4-6 wks.       Date:  2021    Patient Name:  Roxana Caal    :  1980  MRN: [id number]  Restrictions/Precautions:    Medical/Treatment Diagnosis Information:  · Diagnosis: left shoulder scapular dyskinesis and IR tightness; left shoulder pain M25.512. Onset- Sept 2020  · Treatment Diagnosis: left shoulder [id number]  Insurance/Certification information:  PT Insurance Information: [employer]  Physician Information:  Referring Practitioner: Mahesh  Has the plan of care been signed (Y/N):        []  Yes  [x]  No     Date of Patient follow up with Physician: prn      Is this a Progress Report:     []  Yes  [x]  No        If Yes:  Date Range for reporting period:  Beginning 12/8/2020  Ending 7 Jan 21    Progress report will be due (10 Rx or 30 days whichever is less): visit 10 or 5 Jan 3284      Recertification will be due (POC Duration  / 90 days whichever is less): see above        Visit # Insurance Allowable Auth Required   4+1 2021 40 []  Yes []  No        Functional Scale: UEFI-93.75%    Date assessed: 1/7/21    Latex Allergy:  [x]NO      []YES  Preferred Language for Healthcare:   [x]English       []other:    Pain level:  3-4/10     SUBJECTIVE:  Pt reports feeling sore from pushing Keiko wrapping into the trash. She normally doesn't have pain except for odd things like this. Overall, she is feeling better.     OBJECTIVE: 1/7/21    Observation:    Test measurements:      ROM PROM AROM  Comment    L R L R    Flexion   178     Abduction   170     ER   97     IR   58     Other        Other             Strength L R Comment   Flexion 4     Abduction 4     ER 4     IR 4+     Supraspinatus      Upper Trap      Lower Trap      Mid Trap      Rhomboids      Biceps      Triceps      Horizontal Abduction      Horizontal Adduction      Lats          RESTRICTIONS/PRECAUTIONS: anxiety- PCP is aware, depression- PCP is aware, currently pregnant, migraines that are stress induced or hormonal.      Exercises/Interventions:   Exercise/Equipment Resistance/Repetitions Other comments   Aerobic Conditioning Aerodyne          Stretching/PROM     Wand Table Slides Wall slides  10 x :10 Flex and ABD   UE [place] 10x:10 Cane ER at 80   Pulleys     Pendulum     BB IR 10x:10    SL IR 10x:10    Pec doorway stretch     CBA stretch     UT stretch     LS stretch     Isometrics     Retraction          Weight shift     Flexion    Abduction    External Rotation    Internal Rotation    Biceps     Triceps          PRE's     Flexion     Abduction     External Rotation 3x10 green    Internal Rotation 3x10 red    Shrugs 3x10 4#    EXT     Reverse Flys     Serratus 3x10 3#    Horizontal Abd with ER 3x10 red    Biceps 3x10 4#    Triceps     Retraction     UK deltoid 5' 1#Prone T          Cable Column/Theraband     External Rotation     Internal Rotation     Shrugs     Lats     Ext 3x10 blue    Flex     Scapular Retraction 3x10 black    BIC     TRIC     PNF          Dynamic Stability     Ball on the wall 10 x 3 each, 4-way, kickball         Plyoback            Access Code: [email or web address]   URL: [email or web address]   Date: 01/07/2021   Prepared by: Kandis Mayen     Exercises  Supine Shoulder Flexion Extension AAROM with Dowel - 10 reps - 10 hold - 2x daily - 7x weekly  Supine Shoulder External Rotation in 45 Degrees Abduction AAROM with Dowel - 10 reps - 10 hold - 2x daily - 7x weekly  Sleeper Stretch - 10 reps - 10 hold - 2x daily - 7x weekly  Standing Shoulder Internal Rotation Stretch with Towel - 10 reps - 10 hold - 2x daily - 7x weekly  Supine Scapular Protraction in Flexion with Dumbbells - 10 reps - 3 sets - 1-2x daily - 7x weekly  Scapular Retraction with Resistance - 10 reps - 3 sets - 1-2x daily - 7x weekly  Shoulder Extension with Resistance - 10 reps - 3 sets - 1-2x daily - 7x weekly  Supine Shoulder Flexion AAROM with Hands Clasped - 2x daily - 7x weekly  Standing Shoulder Horizontal Abduction with Resistance - 10 reps - 3 sets - 1x daily - 3x weekly  Shoulder Internal Rotation with Resistance - 10 reps - 3 sets - 1x daily - 3x weekly  Shoulder External Rotation with Anchored Resistance - 10 reps - 3 sets - 1x daily - 3x weekly  Standing Shoulder Shrugs - 10 reps - 3 sets - 1x daily - 3x weekly  Standing Bicep Curls Supinated with Dumbbells - 10 reps - 3 sets                            - 1x daily - 3x weekly    Therapeutic Exercise and NMR EXR  [x] (33157) Provided verbal/tactile cueing for activities related to strengthening, flexibility, endurance, ROM for improvements in LE, proximal hip, and core control with self care, mobility, lifting, ambulation. [x] (30196) Provided verbal/tactile cueing for activities related to improving balance, coordination, kinesthetic sense, posture, motor skill, proprioception  to assist with LE, proximal hip, and core control in self care, mobility, lifting, ambulation and eccentric single leg control.      NMR and Therapeutic Activities:    [x] (50709 or 92380) Provided verbal/tactile cueing for activities related to improving balance, coordination, kinesthetic sense, posture, motor skill, proprioception and motor activation to allow for proper function of core, proximal hip and LE with self care and ADLs  [x] (97904) Gait Re-education- Provided training and instruction to the patient for proper LE, core and proximal hip recruitment and positioning and eccentric body weight control with ambulation re-education including up and down stairs     Home Exercise Program:    [x] (32438) Reviewed/Progressed HEP activities related to strengthening, flexibility, endurance, ROM of core, proximal hip and LE for functional self-care, mobility, lifting and ambulation/stair navigation   [x] (29661)Reviewed/Progressed HEP activities related to improving balance, coordination, kinesthetic sense, posture, motor skill, proprioception of core, proximal hip and LE for self care, mobility, lifting, and ambulation/stair navigation      Manual Treatments:  PROM / STM / Oscillations-Mobs:  G-I, II, III, IV (PA's, Inf., Post.)  [x] (86328) Provided manual therapy to mobilize LE, proximal hip and/or LS spine soft tissue/joints for the purpose of modulating pain, promoting relaxation,  increasing ROM, reducing/eliminating soft tissue swelling/inflammation/restriction, improving soft tissue extensibility and allowing for proper ROM for normal function with self care, mobility, lifting and ambulation. Modalities:      Charges:   Timed Code Treatment Minutes: 43'    Total Treatment Minutes: 48'      [] EVAL (LOW) [id number]   [] EVAL (MOD) 34047   [] EVAL (HIGH) 47930   [] RE-EVAL   [x] BC(46284) x   1  [] IONTO  [x] NMR (70709) x  1   [] VASO  [] Manual (40748) x      [] Other:  [x] TA  (26142) x 1    [] Mech Traction (28471)  [] ES(attended) (46478)      [] ES (un) (57593):       GOALS:   Patient stated goal: have full use of the shoulder without pain    []? Progressing: [x]? Met: []? Not Met: []? Adjusted     Therapist goals for Patient:   Short Term Goals: To be achieved in: 2 weeks  1. Independent in HEP and progression per patient tolerance, in order to prevent re-injury. []? Progressing: [x]? Met: []? Not Met: []? Adjusted   2. Patient will report pain at worst less than or equal to 5/10. [x]? Progressing: []? Met: []? Not Met: []? Adjusted     Long Term Goals: To be achieved in: 6 weeks  1. Pt will demo UEFI of 90% or better to assist with reaching prior level of function. []? Progressing: [x]? Met: []? Not Met: []? Adjusted   2. Patient will demonstrate increased AROM to shoulder flex greater than or equal to 180, ABD greater than or equal to 180, ER greater than or equal to 80, IR greater than or equal to 60 to allow for proper joint functioning as indicated by patients Functional Deficits. [x]? Progressing: []? Met: []? Not Met: []? Adjusted  3.  Patient will demonstrate an increase in strength to shoulder flex greater than or equal to 4, ABD greater than or equal to 4, ER greater than or equal to 4, IR greater than or equal to 4 to allow for proper functional mobility as indicated by patients functional deficits. [x]? Progressing: []? Met: []? Not Met: []? Adjusted  4. Patient will return to performing all self care without increased symptoms or restriction. [x]? Progressing: []? Met: []? Not Met: []? Adjusted  5. Pt will report pain at worst less than or equal to 2/10.  (patient specific functional goal)    [x]? Progressing: []? Met: []? Not Met: []? Adjusted      Overall Progression Towards Functional goals/ Treatment Progress Update:  [x] Patient is progressing as expected towards functional goals listed. [] Progression is slowed due to complexities/Impairments listed. [] Progression has been slowed due to co-morbidities. [] Plan just implemented, too soon to assess goals progression <30days   [] Goals require adjustment due to lack of progress  [] Patient is not progressing as expected and requires additional follow up with physician  [] Other    Prognosis for POC: [x] Good [] Fair  [] Poor      Patient requires continued skilled intervention: [x] Yes  [] No    Treatment/Activity Tolerance:  [x] Patient able to complete treatment  [] Patient limited by fatigue  [] Patient limited by pain     [] Patient limited by other medical complications  [] Other: Pt meggan session well. She demo significantly improved ROM and strength. She demo good comprehension of HEP. She meggan progressions well. I did update her HEP. Her pain today is more likely from using her arm more. This was a recent onset of pain that has been improving. We did review differential diagnosis and how her tx may be different if she wasn't pregnant. I reassured her that her tx would be pretty much the same as it is currently. She is understanding. Pt would continue to benefit from skilled PT to improve strength, ROM, and function. Continue tx 1x/wk x4-6 wks. PLAN: If pt doesn't return, this note can be considered a D/C note.   Progress strength per pt meggan.    [x] Continue per plan of care [] Alter current plan (see comments above)  [] Plan of care initiated [] Hold pending MD visit [] Discharge    Electronically signed by: Jeevan Lindsay PT, DPT

## 2021-01-13 ENCOUNTER — HOSPITAL ENCOUNTER (OUTPATIENT)
Dept: PHYSICAL THERAPY | Age: 41
Setting detail: THERAPIES SERIES
Discharge: HOME | End: 2021-01-13
Payer: COMMERCIAL

## 2021-01-13 PROCEDURE — 97110 THERAPEUTIC EXERCISES: CPT | Performed by: PHYSICAL THERAPIST

## 2021-01-13 PROCEDURE — 97112 NEUROMUSCULAR REEDUCATION: CPT | Performed by: PHYSICAL THERAPIST

## 2021-01-13 PROCEDURE — 97530 THERAPEUTIC ACTIVITIES: CPT | Performed by: PHYSICAL THERAPIST

## 2021-01-13 NOTE — FLOWSHEET NOTE
Orthopaedics and [address] [place] [address] DR YOLI BRIGHT                   Physical Therapy Treatment Note/ Progress Report:           Date:  2021    Patient Name:  Naseem Clarke    :  1980  MRN: [id number]  Restrictions/Precautions:    Medical/Treatment Diagnosis Information:  · Diagnosis: left shoulder scapular dyskinesis and IR tightness; left shoulder pain M25.512. Onset- 2020  · Treatment Diagnosis: left shoulder [id number]  Insurance/Certification information:  PT Insurance Information: [employer]isi  Physician Information:  Referring Practitioner: Mahesh  Has the plan of care been signed (Y/N):        []  Yes  [x]  No     Date of Patient follow up with Physician: prn      Is this a Progress Report:     []  Yes  [x]  No        If Yes:  Date Range for reporting period:  Beginning 2020  Ending     Progress report will be due (10 Rx or 30 days whichever is less): visit 10 or 7675      Recertification will be due (POC Duration  / 90 days whichever is less): see above        Visit # Insurance Allowable Auth Required   4+2  40 []  Yes []  No        Functional Scale: UEFI-93.75%    Date assessed: 21    Latex Allergy:  [x]NO      []YES  Preferred Language for Healthcare:   [x]English       []other:    Pain level: 0/10     SUBJECTIVE:  She has been feeling pretty much back to baseline. She had her  put the trees away to avoid lifting too much.       OBJECTIVE: 21    Observation:    Test measurements:      ROM PROM AROM  Comment    L R L R    Flexion   178     Abduction   170     ER   97     IR   58     Other        Other             Strength L R Comment   Flexion 4     Abduction 4     ER 4     IR 4+     Supraspinatus      Upper Trap      Lower Trap      Mid Trap      Rhomboids      Biceps      Triceps      Horizontal Abduction      Horizontal Adduction      Lats          RESTRICTIONS/PRECAUTIONS: anxiety- PCP is aware, depression- PCP is aware, currently pregnant, migraines that are stress induced or hormonal.      Exercises/Interventions:   Exercise/Equipment Resistance/Repetitions Other comments   Aerobic Conditioning     Aerodyne          Stretching/PROM     Wand Table Slides Wall slides  10 x :10 Flex and ABD   UE [place] 10x:10 Cane ER at 80   Pulleys     Pendulum     BB IR 10x:10    SL IR 10x:10    Pec doorway stretch     CBA stretch     UT stretch     LS stretch     Isometrics     Retraction          Weight shift     Flexion     Abduction     External Rotation     Internal Rotation     Biceps     Triceps          PRE's     Flexion     Abduction 3x10 2# scaption   External Rotation     Internal Rotation     Shrugs 3x10 5#    EXT     Reverse Flys     Serratus 3x10 4#    Horizontal Abd with ER 3x10 red standing   Biceps 3x10 5#    Triceps 3x10 4# Bent over   Retraction     UK deltoid Prone T          Cable Column/Theraband     External Rotation 3x10 green    Internal Rotation 3x10 green    Shrugs     Lats     Ext 3x10 black    Flex     Scapular Retraction 3x10 black    BIC     TRIC     PNF          Dynamic Stability     Ball on the wall 10 x 3 each, 4-way, 2#         [employer]lyoback            Access Code: [email or web address]   URL: [email or web address]   Date: 01/13/2021   Prepared by: Shamar Mayen     Exercises  Supine Shoulder Flexion Extension AAROM with Dowel - 10 reps - 10 hold - 2x daily - 7x weekly  Supine Shoulder External Rotation in 45 Degrees Abduction AAROM with Dowel - 10 reps - 10 hold - 2x daily - 7x weekly  Sleeper Stretch - 10 reps - 10 hold - 2x daily - 7x weekly  Standing Shoulder Internal Rotation Stretch with Towel - 10 reps - 10 hold - 2x daily - 7x weekly  Supine Scapular Protraction in Flexion with Dumbbells - 10 reps - 3 sets - 1-2x daily - 7x weekly  Scapular Retraction with Resistance - 10 reps - 3 sets - 1-2x daily - 7x weekly  Shoulder Extension with Resistance - 10 reps - 3 sets - 1-2x daily - 7x weekly  Supine Shoulder Flexion AAROM with Hands Clasped - 2x daily - 7x weekly  Standing Shoulder Horizontal Abduction with Resistance - 10 reps - 3 sets - 1x daily - 3x weekly  Shoulder Internal Rotation with Resistance - 10 reps - 3 sets - 1x daily - 3x weekly  Shoulder External Rotation with Anchored Resistance - 10 reps - 3 sets - 1x daily - 3x weekly  Standing Shoulder Shrugs - 10 reps - 3 sets - 1x daily - 3x weekly  Standing Bicep Curls Supinated with Dumbbells - 10 reps - 3 sets                            - 1x daily - 3x weekly  Standing Shoulder Scaption - 10 reps - 3 sets - 1x daily - 3x weekly  Standing Bent Over on Chair Single Arm Tricep Extension with Dumbbell - 10 reps - 3 sets - 1x daily - 3x weekly  Standing Wall Ball Circles with Mini Swiss Ball - 10 reps - 3 sets - 1x daily - 3x weekly    Therapeutic Exercise and NMR EXR  [x] (89409) Provided verbal/tactile cueing for activities related to strengthening, flexibility, endurance, ROM for improvements in LE, proximal hip, and core control with self care, mobility, lifting, ambulation. [x] (10973) Provided verbal/tactile cueing for activities related to improving balance, coordination, kinesthetic sense, posture, motor skill, proprioception  to assist with LE, proximal hip, and core control in self care, mobility, lifting, ambulation and eccentric single leg control.      NMR and Therapeutic Activities:    [x] (45759 or 42264) Provided verbal/tactile cueing for activities related to improving balance, coordination, kinesthetic sense, posture, motor skill, proprioception and motor activation to allow for proper function of core, proximal hip and LE with self care and ADLs  [x] (52434) Gait Re-education- Provided training and instruction to the patient for proper LE, core and proximal hip recruitment and positioning and eccentric body weight control with ambulation re-education including up and down stairs     Home Exercise Program:    [x] (52080) Reviewed/Progressed HEP activities related to strengthening, flexibility, endurance, ROM of core, proximal hip and LE for functional self-care, mobility, lifting and ambulation/stair navigation   [x] (78760)Reviewed/Progressed HEP activities related to improving balance, coordination, kinesthetic sense, posture, motor skill, proprioception of core, proximal hip and LE for self care, mobility, lifting, and ambulation/stair navigation      Manual Treatments:  PROM / STM / Oscillations-Mobs:  G-I, II, III, IV (PA's, Inf., Post.)  [x] (56476) Provided manual therapy to mobilize LE, proximal hip and/or LS spine soft tissue/joints for the purpose of modulating pain, promoting relaxation,  increasing ROM, reducing/eliminating soft tissue swelling/inflammation/restriction, improving soft tissue extensibility and allowing for proper ROM for normal function with self care, mobility, lifting and ambulation. Modalities:      Charges:   Timed Code Treatment Minutes: 43'    Total Treatment Minutes: 39'      [] EVAL (LOW) 455 1011   [] EVAL (MOD) 13984   [] EVAL (HIGH) 37167   [] RE-EVAL   [x] JS(17361) x   1  [] IONTO  [x] NMR (76362) x  1   [] VASO  [] Manual (90894) x      [] Other:  [x] TA  (10496) x 1    [] Mech Traction (06736)  [] ES(attended) (54140)      [] ES (un) (12225):       GOALS:   Patient stated goal: have full use of the shoulder without pain    []? Progressing: [x]? Met: []? Not Met: []? Adjusted     Therapist goals for Patient:   Short Term Goals: To be achieved in: 2 weeks  1. Independent in HEP and progression per patient tolerance, in order to prevent re-injury. []? Progressing: [x]? Met: []? Not Met: []? Adjusted   2. Patient will report pain at worst less than or equal to 5/10. [x]? Progressing: []? Met: []? Not Met: []? Adjusted     Long Term Goals: To be achieved in: 6 weeks  1. Pt will demo UEFI of 90% or better to assist with reaching prior level of function. []? Progressing: [x]? Met: []? Not Met: []? Adjusted   2.  Patient will demonstrate increased AROM to shoulder flex greater than or equal to 180, ABD greater than or equal to 180, ER greater than or equal to 80, IR greater than or equal to 60 to allow for proper joint functioning as indicated by patients Functional Deficits. [x]? Progressing: []? Met: []? Not Met: []? Adjusted  3. Patient will demonstrate an increase in strength to shoulder flex greater than or equal to 4, ABD greater than or equal to 4, ER greater than or equal to 4, IR greater than or equal to 4 to allow for proper functional mobility as indicated by patients functional deficits. [x]? Progressing: []? Met: []? Not Met: []? Adjusted  4. Patient will return to performing all self care without increased symptoms or restriction. [x]? Progressing: []? Met: []? Not Met: []? Adjusted  5. Pt will report pain at worst less than or equal to 2/10.  (patient specific functional goal)    [x]? Progressing: []? Met: []? Not Met: []? Adjusted      Overall Progression Towards Functional goals/ Treatment Progress Update:  [x] Patient is progressing as expected towards functional goals listed. [] Progression is slowed due to complexities/Impairments listed. [] Progression has been slowed due to co-morbidities. [] Plan just implemented, too soon to assess goals progression <30days   [] Goals require adjustment due to lack of progress  [] Patient is not progressing as expected and requires additional follow up with physician  [] Other    Prognosis for POC: [x] Good [] Fair  [] Poor      Patient requires continued skilled intervention: [x] Yes  [] No    Treatment/Activity Tolerance:  [x] Patient able to complete treatment  [] Patient limited by fatigue  [] Patient limited by pain     [] Patient limited by other medical complications  [] Other: Pt meggan session well. She requires some cuing for proper form. Overall, she does have a good comprehension of her HEP. She is having much less pain since starting tx as well.   Due to this, we discussed coming again in 2-3 weeks to hopefully wean to HEP. Pt would continue to benefit from skilled PT to improve strength, ROM, and function. PLAN: If pt doesn't return, this note can be considered a D/C note. Progress strength per pt meggan.   Pt   [x] Continue per plan of care [] Alter current plan (see comments above)  [] Plan of care initiated [] Hold pending MD visit [] Discharge    Electronically signed by: Gelacio Suarez PT, DPT

## 2021-01-28 ENCOUNTER — HOSPITAL ENCOUNTER (OUTPATIENT)
Dept: PHYSICAL THERAPY | Age: 41
Setting detail: THERAPIES SERIES
Discharge: HOME | End: 2021-01-28
Payer: COMMERCIAL

## 2021-01-28 PROCEDURE — 97110 THERAPEUTIC EXERCISES: CPT | Performed by: PHYSICAL THERAPIST

## 2021-01-28 PROCEDURE — 97530 THERAPEUTIC ACTIVITIES: CPT | Performed by: PHYSICAL THERAPIST

## 2021-01-28 NOTE — PLAN OF CARE
Orthopaedics and [address] [place] [address]                   Physical Therapy Treatment Note/ Progress Report:      Physical Therapy Re-Certification Plan of Care    Dear Dr. Radha Llanes,    We had the pleasure of treating the following patient for physical therapy services at [address] [place] [address]. A summary of our findings can be found in the updated assessment below. This includes our plan of care. If you have any questions or concerns regarding these findings, please do not hesitate to contact me at the office phone number checked above. Thank you for the referral.     Physician Signature:________________________________Date:__________________  By signing above (or electronic signature), therapists plan is approved by physician    Date Range Of Visits:   Total Visits to Date: 7  Overall Response to Treatment:   [x] Patient is responding well to treatment and improvement is noted with regards to goals   [] Patient should continue to improve in reasonable time if they continue HEP   [] Patient has plateaued and is no longer responding to skilled PT intervention    [] Patient is getting worse and would benefit from return to referring MD   [] Patient unable to adhere to initial POC   [] Other: Pt meggan session well. She has been doing very well overall. She demo good comprehension of her HEP. We discussed how to progress her HEP going forward and which exercises are the most important to work on. I've asked her to continue her HEP 2-3x/wk for 4-6 wks. She can then work on them through her workout routine. We discussed the importance of posture as well. She is understanding. Due to her good improvements and her comfort with continuing with her HEP, she will be discharged to [facility]. She does know that she can return prn.                       Date:  2021    Patient Name:  Edilson Steele    :  1980  MRN: [id number]  Restrictions/Precautions: Medical/Treatment Diagnosis Information:  · Diagnosis: left shoulder scapular dyskinesis and IR tightness; left shoulder pain M25.512. Onset- Sept 2020  · Treatment Diagnosis: left shoulder [id number]  Insurance/Certification information:  PT Insurance Information: [employer]  Physician Information:  Referring Practitioner: Mahesh  Has the plan of care been signed (Y/N):        []  Yes  [x]  No     Date of Patient follow up with Physician: prn      Is this a Progress Report:     []  Yes  [x]  No        If Yes:  Date Range for reporting period:  Beginning 12/8/2020  Ending 7 Jan 21    Progress report will be due (10 Rx or 30 days whichever is less): visit 10 or 5 Jan 1663      Recertification will be due (POC Duration  / 90 days whichever is less): see above        Visit # Insurance Allowable Auth Required   4+3 2021 40 []  Yes []  No        Functional Scale: UEFI-93.75%    Date assessed: 1/7/21    Latex Allergy:  [x]NO      []YES  Preferred Language for Healthcare:   [x]English       []other:    Pain level: 0/10     SUBJECTIVE:  It's been a little achy. It's been rainy. She feels that may be related to the weather as it has not been situational.  She has not had any pain with any activity. She feels about 98% back to normal.  She even did something that she thought was going to hurt, but it felt fine afterwards.        OBJECTIVE: 1/28/21    Observation:    Test measurements:      ROM PROM AROM  Comment    L R L R    Flexion   174     Abduction   176     ER   90     IR   50     Other        Other             Strength L R Comment   Flexion 4+     Abduction 4     ER 4     IR 4+     Supraspinatus      Upper Trap      Lower Trap      Mid Trap      Rhomboids      Biceps      Triceps      Horizontal Abduction      Horizontal Adduction      Lats          RESTRICTIONS/PRECAUTIONS: anxiety- PCP is aware, depression- PCP is aware, currently pregnant, migraines that are stress induced or hormonal.      Exercises/Interventions:   Exercise/Equipment Resistance/Repetitions Other comments   Aerobic Conditioning     Aerodyne          Stretching/PROM     Wand Table Slides Wall slides  10 x :10 Flex and ABD   UE [place] 10x:10 Cane ER at 80   Pulleys     Pendulum     BB IR 10x:10    SL IR 10x:10    Pec doorway stretch     CBA stretch     UT stretch     LS stretch     Isometrics     Retraction          Weight shift     Flexion     Abduction     External Rotation     Internal Rotation     Biceps     Triceps          PRE's     Flexion     Abduction 3x10 2# scaption   External Rotation     Internal Rotation     Shrugs 3x10 5#    EXT     Reverse Flys     Serratus 3x10 5#    Horizontal Abd with ER 3x10 red standing   Biceps 3x10 5#    Triceps 3x10 4# Bent over   Retraction     UK deltoid Prone T          Cable Column/Theraband     External Rotation 3x10 green    Internal Rotation 3x10 green    Shrugs     Lats     Ext 3x10 black    Flex     Scapular Retraction 3x10 black    BIC     TRIC     PNF          Dynamic Stability     Ball on the wall 10 x 3 each, 4-way, 2#         Plyoback            Access Code: [email or web address]   URL: [email or web address]   Date: 01/13/2021   Prepared by: Mike Mayen     Exercises  Supine Shoulder Flexion Extension AAROM with Dowel - 10 reps - 10 hold - 2x daily - 7x weekly  Supine Shoulder External Rotation in 45 Degrees Abduction AAROM with Dowel - 10 reps - 10 hold - 2x daily - 7x weekly  Sleeper Stretch - 10 reps - 10 hold - 2x daily - 7x weekly  Standing Shoulder Internal Rotation Stretch with Towel - 10 reps - 10 hold - 2x daily - 7x weekly  Supine Scapular Protraction in Flexion with Dumbbells - 10 reps - 3 sets - 1-2x daily - 7x weekly  Scapular Retraction with Resistance - 10 reps - 3 sets - 1-2x daily - 7x weekly  Shoulder Extension with Resistance - 10 reps - 3 sets - 1-2x daily - 7x weekly  Supine Shoulder Flexion AAROM with Hands Clasped - 2x daily - 7x weekly  Standing Shoulder Horizontal Abduction with Resistance - 10 reps - 3 sets - 1x daily - 3x weekly  Shoulder Internal Rotation with Resistance - 10 reps - 3 sets - 1x daily - 3x weekly  Shoulder External Rotation with Anchored Resistance - 10 reps - 3 sets - 1x daily - 3x weekly  Standing Shoulder Shrugs - 10 reps - 3 sets - 1x daily - 3x weekly  Standing Bicep Curls Supinated with Dumbbells - 10 reps - 3 sets                            - 1x daily - 3x weekly  Standing Shoulder Scaption - 10 reps - 3 sets - 1x daily - 3x weekly  Standing Bent Over on Chair Single Arm Tricep Extension with Dumbbell - 10 reps - 3 sets - 1x daily - 3x weekly  Standing Wall Ball Circles with Mini Swiss Ball - 10 reps - 3 sets - 1x daily - 3x weekly    Therapeutic Exercise and NMR EXR  [x] (63835) Provided verbal/tactile cueing for activities related to strengthening, flexibility, endurance, ROM for improvements in LE, proximal hip, and core control with self care, mobility, lifting, ambulation. [x] (98898) Provided verbal/tactile cueing for activities related to improving balance, coordination, kinesthetic sense, posture, motor skill, proprioception  to assist with LE, proximal hip, and core control in self care, mobility, lifting, ambulation and eccentric single leg control.      NMR and Therapeutic Activities:    [x] (89089 or 72231) Provided verbal/tactile cueing for activities related to improving balance, coordination, kinesthetic sense, posture, motor skill, proprioception and motor activation to allow for proper function of core, proximal hip and LE with self care and ADLs  [x] (15804) Gait Re-education- Provided training and instruction to the patient for proper LE, core and proximal hip recruitment and positioning and eccentric body weight control with ambulation re-education including up and down stairs     Home Exercise Program:    [x] (96248) Reviewed/Progressed HEP activities related to strengthening, flexibility, endurance, ROM of core, proximal hip and LE for functional self-care, mobility, lifting and ambulation/stair navigation   [x] (19932)Reviewed/Progressed HEP activities related to improving balance, coordination, kinesthetic sense, posture, motor skill, proprioception of core, proximal hip and LE for self care, mobility, lifting, and ambulation/stair navigation      Manual Treatments:  PROM / STM / Oscillations-Mobs:  G-I, II, III, IV (PA's, Inf., Post.)  [x] (09450) Provided manual therapy to mobilize LE, proximal hip and/or LS spine soft tissue/joints for the purpose of modulating pain, promoting relaxation,  increasing ROM, reducing/eliminating soft tissue swelling/inflammation/restriction, improving soft tissue extensibility and allowing for proper ROM for normal function with self care, mobility, lifting and ambulation. Modalities:      Charges:   Timed Code Treatment Minutes: 30'    Total Treatment Minutes: 39'      [] EVAL (LOW) 455 1011   [] EVAL (MOD) 43703   [] EVAL (HIGH) 18882   [] RE-EVAL   [x] DX(23445) x   1  [] IONTO  [] NMR (06585) x     [] VASO  [] Manual (52423) x      [] Other:  [x] TA  (86840) x 1    [] Mech Traction (69470)  [] ES(attended) (98669)      [] ES (un) (02897):       GOALS:   Patient stated goal: have full use of the shoulder without pain    []? Progressing: [x]? Met: []? Not Met: []? Adjusted     Therapist goals for Patient:   Short Term Goals: To be achieved in: 2 weeks  1. Independent in HEP and progression per patient tolerance, in order to prevent re-injury. []? Progressing: [x]? Met: []? Not Met: []? Adjusted   2. Patient will report pain at worst less than or equal to 5/10. []? Progressing: [x]? Met: []? Not Met: []? Adjusted     Long Term Goals: To be achieved in: 6 weeks  1. Pt will demo UEFI of 90% or better to assist with reaching prior level of function. []? Progressing: [x]? Met: []? Not Met: []? Adjusted    2.  Patient will demonstrate increased AROM to shoulder flex greater than or equal to 180, ABD greater than or equal to 180, ER greater than or equal to 80, IR greater than or equal to 60 to allow for proper joint functioning as indicated by patients Functional Deficits. [x]? Progressing: []? Met: []? Not Met: []? Adjusted  3. Patient will demonstrate an increase in strength to shoulder flex greater than or equal to 4, ABD greater than or equal to 4, ER greater than or equal to 4, IR greater than or equal to 4 to allow for proper functional mobility as indicated by patients functional deficits. []? Progressing: [x]? Met: []? Not Met: []? Adjusted  4. Patient will return to performing all self care without increased symptoms or restriction. [x]? Progressing: [x]? Met: []? Not Met: []? Adjusted  5. Pt will report pain at worst less than or equal to 2/10.  (patient specific functional goal)    []? Progressing: [x]? Met: []? Not Met: []? Adjusted      Overall Progression Towards Functional goals/ Treatment Progress Update:  [x] Patient is progressing as expected towards functional goals listed. [] Progression is slowed due to complexities/Impairments listed. [] Progression has been slowed due to co-morbidities. [] Plan just implemented, too soon to assess goals progression <30days   [] Goals require adjustment due to lack of progress  [] Patient is not progressing as expected and requires additional follow up with physician  [] Other    Prognosis for POC: [x] Good [] Fair  [] Poor      Patient requires continued skilled intervention: [x] Yes  [] No    Treatment/Activity Tolerance:  [x] Patient able to complete treatment  [] Patient limited by fatigue  [] Patient limited by pain     [] Patient limited by other medical complications  [] Other: Pt meggan session well. She has been doing very well overall. She demo good comprehension of her HEP. We discussed how to progress her HEP going forward and which exercises are the most important to work on.   I've asked her to continue her HEP 2-3x/wk for 4-6 wks. She can then work on them through her workout routine. We discussed the importance of posture as well. She is understanding. Due to her good improvements and her comfort with continuing with her HEP, she will be discharged to HEP. She does know that she can return prn. PLAN: Pt is D/C to HEP, and she can return prn.    [x] Continue per plan of care [] Alter current plan (see comments above)  [] Plan of care initiated [] Hold pending MD visit [] Discharge    Electronically signed by: Racheal Alvarez PT, DPT

## 2021-02-07 ENCOUNTER — ANESTHESIA EVENT (OUTPATIENT)
Dept: LABOR AND DELIVERY | Age: 41
End: 2021-02-07
Payer: COMMERCIAL

## 2021-02-07 ENCOUNTER — HOSPITAL ENCOUNTER (OUTPATIENT)
Age: 41
Discharge: HOME | End: 2021-02-07
Attending: EMERGENCY MEDICINE | Admitting: OBSTETRICS & GYNECOLOGY
Payer: COMMERCIAL

## 2021-02-07 ENCOUNTER — ANESTHESIA (OUTPATIENT)
Dept: LABOR AND DELIVERY | Age: 41
End: 2021-02-07
Payer: COMMERCIAL

## 2021-02-07 VITALS
DIASTOLIC BLOOD PRESSURE: 59 MMHG | OXYGEN SATURATION: 85 % | RESPIRATION RATE: 16 BRPM | SYSTOLIC BLOOD PRESSURE: 104 MMHG

## 2021-02-07 VITALS
DIASTOLIC BLOOD PRESSURE: 56 MMHG | RESPIRATION RATE: 16 BRPM | SYSTOLIC BLOOD PRESSURE: 130 MMHG | BODY MASS INDEX: 28.35 KG/M2 | HEIGHT: 63 IN | WEIGHT: 160 LBS | OXYGEN SATURATION: 100 % | HEART RATE: 64 BPM | TEMPERATURE: 98.4 F

## 2021-02-07 DIAGNOSIS — O03.4 INCOMPLETE ABORTION: Primary | ICD-10-CM

## 2021-02-07 LAB
A/G RATIO: 1.4 (ref 1.1–2.2)
ABO/RH: NORMAL
ALBUMIN SERPL-MCNC: 4.1 G/DL (ref 3.4–5)
ALP BLD-CCNC: 104 U/L (ref 40–129)
ALT SERPL-CCNC: 22 U/L (ref 10–40)
ANION GAP SERPL CALCULATED.3IONS-SCNC: 7 MMOL/L (ref 3–16)
ANTIBODY SCREEN: NORMAL
ANTIBODY SCREEN: NORMAL
AST SERPL-CCNC: 24 U/L (ref 15–37)
BASOPHILS ABSOLUTE: 0 K/UL (ref 0–0.2)
BASOPHILS RELATIVE PERCENT: 0.2 %
BILIRUB SERPL-MCNC: 0.7 MG/DL (ref 0–1)
BUN BLDV-MCNC: 14 MG/DL (ref 7–20)
CALCIUM SERPL-MCNC: 8.6 MG/DL (ref 8.3–10.6)
CHLORIDE BLD-SCNC: 106 MMOL/L (ref 99–110)
CO2: 21 MMOL/L (ref 21–32)
CREAT SERPL-MCNC: 0.5 MG/DL (ref 0.6–1.1)
EOSINOPHILS ABSOLUTE: 0.1 K/UL (ref 0–0.6)
EOSINOPHILS RELATIVE PERCENT: 1 %
GFR AFRICAN AMERICAN: >60
GFR NON-AFRICAN AMERICAN: >60
GLOBULIN: 2.9 G/DL
GLUCOSE BLD-MCNC: 123 MG/DL (ref 70–99)
GONADOTROPIN, CHORIONIC (HCG) QUANT: 3022 MIU/ML
HCT VFR BLD CALC: 35.7 % (ref 36–48)
HEMOGLOBIN: 12 G/DL (ref 12–16)
LYMPHOCYTES ABSOLUTE: 2.3 K/UL (ref 1–5.1)
LYMPHOCYTES RELATIVE PERCENT: 23.6 %
MCH RBC QN AUTO: 30 PG (ref 26–34)
MCHC RBC AUTO-ENTMCNC: 33.7 G/DL (ref 31–36)
MCV RBC AUTO: 89.1 FL (ref 80–100)
MONOCYTES ABSOLUTE: 0.5 K/UL (ref 0–1.3)
MONOCYTES RELATIVE PERCENT: 5.5 %
NEUTROPHILS ABSOLUTE: 6.8 K/UL (ref 1.7–7.7)
NEUTROPHILS RELATIVE PERCENT: 69.7 %
PDW BLD-RTO: 12.8 % (ref 12.4–15.4)
PLATELET # BLD: 293 K/UL (ref 135–450)
PMV BLD AUTO: 7.9 FL (ref 5–10.5)
POTASSIUM SERPL-SCNC: 3.8 MMOL/L (ref 3.5–5.1)
RBC # BLD: 4 M/UL (ref 4–5.2)
SARS-COV-2, NAAT: NOT DETECTED
SODIUM BLD-SCNC: 134 MMOL/L (ref 136–145)
TOTAL PROTEIN: 7 G/DL (ref 6.4–8.2)
WBC # BLD: 9.7 K/UL (ref 4–11)

## 2021-02-07 PROCEDURE — 2500000003 HC RX 250 WO HCPCS

## 2021-02-07 PROCEDURE — 6360000002 HC RX W HCPCS: Performed by: NURSE ANESTHETIST, CERTIFIED REGISTERED

## 2021-02-07 PROCEDURE — 7100000001 HC PACU RECOVERY - ADDTL 15 MIN: Performed by: OBSTETRICS & GYNECOLOGY

## 2021-02-07 PROCEDURE — 80053 COMPREHEN METABOLIC PANEL: CPT

## 2021-02-07 PROCEDURE — 3700000001 HC ADD 15 MINUTES (ANESTHESIA): Performed by: OBSTETRICS & GYNECOLOGY

## 2021-02-07 PROCEDURE — 2709999900 HC NON-CHARGEABLE SUPPLY: Performed by: OBSTETRICS & GYNECOLOGY

## 2021-02-07 PROCEDURE — 2580000003 HC RX 258: Performed by: OBSTETRICS & GYNECOLOGY

## 2021-02-07 PROCEDURE — 85025 COMPLETE CBC W/AUTO DIFF WBC: CPT

## 2021-02-07 PROCEDURE — 2500000003 HC RX 250 WO HCPCS: Performed by: OBSTETRICS & GYNECOLOGY

## 2021-02-07 PROCEDURE — 99283 EMERGENCY DEPT VISIT LOW MDM: CPT

## 2021-02-07 PROCEDURE — 3600000002 HC SURGERY LEVEL 2 BASE: Performed by: OBSTETRICS & GYNECOLOGY

## 2021-02-07 PROCEDURE — 6370000000 HC RX 637 (ALT 250 FOR IP): Performed by: OBSTETRICS & GYNECOLOGY

## 2021-02-07 PROCEDURE — 88305 TISSUE EXAM BY PATHOLOGIST: CPT

## 2021-02-07 PROCEDURE — 2500000003 HC RX 250 WO HCPCS: Performed by: NURSE ANESTHETIST, CERTIFIED REGISTERED

## 2021-02-07 PROCEDURE — U0002 COVID-19 LAB TEST NON-CDC: HCPCS

## 2021-02-07 PROCEDURE — 86900 BLOOD TYPING SEROLOGIC ABO: CPT

## 2021-02-07 PROCEDURE — 86850 RBC ANTIBODY SCREEN: CPT

## 2021-02-07 PROCEDURE — 84702 CHORIONIC GONADOTROPIN TEST: CPT

## 2021-02-07 PROCEDURE — 86901 BLOOD TYPING SEROLOGIC RH(D): CPT

## 2021-02-07 PROCEDURE — 3700000000 HC ANESTHESIA ATTENDED CARE: Performed by: OBSTETRICS & GYNECOLOGY

## 2021-02-07 PROCEDURE — 7100000000 HC PACU RECOVERY - FIRST 15 MIN: Performed by: OBSTETRICS & GYNECOLOGY

## 2021-02-07 PROCEDURE — 2580000003 HC RX 258: Performed by: NURSE ANESTHETIST, CERTIFIED REGISTERED

## 2021-02-07 PROCEDURE — 3600000012 HC SURGERY LEVEL 2 ADDTL 15MIN: Performed by: OBSTETRICS & GYNECOLOGY

## 2021-02-07 RX ORDER — ACETAMINOPHEN 325 MG/1
650 TABLET ORAL EVERY 6 HOURS PRN
Qty: 120 TABLET | Refills: 3 | Status: ON HOLD | COMMUNITY
Start: 2021-02-07 | End: 2022-03-30 | Stop reason: HOSPADM

## 2021-02-07 RX ORDER — SODIUM CHLORIDE 0.9 % (FLUSH) 0.9 %
10 SYRINGE (ML) INJECTION EVERY 12 HOURS SCHEDULED
Status: DISCONTINUED | OUTPATIENT
Start: 2021-02-07 | End: 2021-02-07 | Stop reason: HOSPADM

## 2021-02-07 RX ORDER — TRISODIUM CITRATE DIHYDRATE AND CITRIC ACID MONOHYDRATE 500; 334 MG/5ML; MG/5ML
SOLUTION ORAL
Status: DISCONTINUED
Start: 2021-02-07 | End: 2021-02-07 | Stop reason: WASHOUT

## 2021-02-07 RX ORDER — SUCCINYLCHOLINE CHLORIDE 20 MG/ML
INJECTION INTRAMUSCULAR; INTRAVENOUS PRN
Status: DISCONTINUED | OUTPATIENT
Start: 2021-02-07 | End: 2021-02-07 | Stop reason: SDUPTHER

## 2021-02-07 RX ORDER — MIDAZOLAM HYDROCHLORIDE 1 MG/ML
INJECTION INTRAMUSCULAR; INTRAVENOUS PRN
Status: DISCONTINUED | OUTPATIENT
Start: 2021-02-07 | End: 2021-02-07 | Stop reason: SDUPTHER

## 2021-02-07 RX ORDER — PROPOFOL 10 MG/ML
INJECTION, EMULSION INTRAVENOUS PRN
Status: DISCONTINUED | OUTPATIENT
Start: 2021-02-07 | End: 2021-02-07 | Stop reason: SDUPTHER

## 2021-02-07 RX ORDER — LIDOCAINE HYDROCHLORIDE 20 MG/ML
INJECTION, SOLUTION INFILTRATION; PERINEURAL PRN
Status: DISCONTINUED | OUTPATIENT
Start: 2021-02-07 | End: 2021-02-07 | Stop reason: SDUPTHER

## 2021-02-07 RX ORDER — SODIUM CHLORIDE, SODIUM LACTATE, POTASSIUM CHLORIDE, CALCIUM CHLORIDE 600; 310; 30; 20 MG/100ML; MG/100ML; MG/100ML; MG/100ML
INJECTION, SOLUTION INTRAVENOUS CONTINUOUS
Status: DISCONTINUED | OUTPATIENT
Start: 2021-02-07 | End: 2021-02-07 | Stop reason: HOSPADM

## 2021-02-07 RX ORDER — SODIUM CHLORIDE, SODIUM LACTATE, POTASSIUM CHLORIDE, AND CALCIUM CHLORIDE .6; .31; .03; .02 G/100ML; G/100ML; G/100ML; G/100ML
1000 INJECTION, SOLUTION INTRAVENOUS ONCE
Status: COMPLETED | OUTPATIENT
Start: 2021-02-07 | End: 2021-02-07

## 2021-02-07 RX ORDER — ONDANSETRON 2 MG/ML
INJECTION INTRAMUSCULAR; INTRAVENOUS PRN
Status: DISCONTINUED | OUTPATIENT
Start: 2021-02-07 | End: 2021-02-07 | Stop reason: SDUPTHER

## 2021-02-07 RX ORDER — METOCLOPRAMIDE HYDROCHLORIDE 5 MG/ML
10 INJECTION INTRAMUSCULAR; INTRAVENOUS EVERY 6 HOURS
Status: DISCONTINUED | OUTPATIENT
Start: 2021-02-07 | End: 2021-02-07 | Stop reason: HOSPADM

## 2021-02-07 RX ORDER — GLYCOPYRROLATE 0.2 MG/ML
INJECTION INTRAMUSCULAR; INTRAVENOUS PRN
Status: DISCONTINUED | OUTPATIENT
Start: 2021-02-07 | End: 2021-02-07 | Stop reason: SDUPTHER

## 2021-02-07 RX ORDER — IBUPROFEN 200 MG
600 TABLET ORAL EVERY 6 HOURS PRN
Qty: 120 TABLET | Refills: 3 | Status: ON HOLD | COMMUNITY
Start: 2021-02-07 | End: 2022-02-23 | Stop reason: ALTCHOICE

## 2021-02-07 RX ORDER — KETOROLAC TROMETHAMINE 30 MG/ML
INJECTION, SOLUTION INTRAMUSCULAR; INTRAVENOUS PRN
Status: DISCONTINUED | OUTPATIENT
Start: 2021-02-07 | End: 2021-02-07 | Stop reason: SDUPTHER

## 2021-02-07 RX ORDER — TRISODIUM CITRATE DIHYDRATE AND CITRIC ACID MONOHYDRATE 500; 334 MG/5ML; MG/5ML
30 SOLUTION ORAL ONCE
Status: COMPLETED | OUTPATIENT
Start: 2021-02-07 | End: 2021-02-07

## 2021-02-07 RX ORDER — SODIUM CHLORIDE 0.9 % (FLUSH) 0.9 %
10 SYRINGE (ML) INJECTION PRN
Status: DISCONTINUED | OUTPATIENT
Start: 2021-02-07 | End: 2021-02-07 | Stop reason: HOSPADM

## 2021-02-07 RX ORDER — OXYTOCIN 10 [USP'U]/ML
INJECTION, SOLUTION INTRAMUSCULAR; INTRAVENOUS PRN
Status: DISCONTINUED | OUTPATIENT
Start: 2021-02-07 | End: 2021-02-07 | Stop reason: SDUPTHER

## 2021-02-07 RX ORDER — FENTANYL CITRATE 50 UG/ML
INJECTION, SOLUTION INTRAMUSCULAR; INTRAVENOUS PRN
Status: DISCONTINUED | OUTPATIENT
Start: 2021-02-07 | End: 2021-02-07 | Stop reason: SDUPTHER

## 2021-02-07 RX ORDER — SODIUM CHLORIDE, SODIUM LACTATE, POTASSIUM CHLORIDE, CALCIUM CHLORIDE 600; 310; 30; 20 MG/100ML; MG/100ML; MG/100ML; MG/100ML
INJECTION, SOLUTION INTRAVENOUS CONTINUOUS PRN
Status: DISCONTINUED | OUTPATIENT
Start: 2021-02-07 | End: 2021-02-07 | Stop reason: SDUPTHER

## 2021-02-07 RX ORDER — DOXYCYCLINE HYCLATE 100 MG
200 TABLET ORAL ONCE
Status: COMPLETED | OUTPATIENT
Start: 2021-02-07 | End: 2021-02-07

## 2021-02-07 RX ADMIN — SODIUM CHLORIDE, POTASSIUM CHLORIDE, SODIUM LACTATE AND CALCIUM CHLORIDE 1000 ML: 600; 310; 30; 20 INJECTION, SOLUTION INTRAVENOUS at 15:41

## 2021-02-07 RX ADMIN — FENTANYL CITRATE 50 MCG: 50 INJECTION INTRAMUSCULAR; INTRAVENOUS at 16:33

## 2021-02-07 RX ADMIN — METOCLOPRAMIDE HYDROCHLORIDE 10 MG: 5 INJECTION INTRAMUSCULAR; INTRAVENOUS at 15:52

## 2021-02-07 RX ADMIN — KETOROLAC TROMETHAMINE 30 MG: 30 INJECTION, SOLUTION INTRAMUSCULAR at 16:54

## 2021-02-07 RX ADMIN — FENTANYL CITRATE 50 MCG: 50 INJECTION INTRAMUSCULAR; INTRAVENOUS at 16:22

## 2021-02-07 RX ADMIN — MIDAZOLAM 2 MG: 1 INJECTION INTRAMUSCULAR; INTRAVENOUS at 16:18

## 2021-02-07 RX ADMIN — PROPOFOL 200 MG: 10 INJECTION, EMULSION INTRAVENOUS at 16:22

## 2021-02-07 RX ADMIN — ONDANSETRON 4 MG: 2 INJECTION INTRAMUSCULAR; INTRAVENOUS at 16:18

## 2021-02-07 RX ADMIN — DOXYCYCLINE HYCLATE 200 MG: 100 TABLET, COATED ORAL at 18:08

## 2021-02-07 RX ADMIN — GLYCOPYRROLATE 0.2 MG: 0.2 INJECTION, SOLUTION INTRAMUSCULAR; INTRAVENOUS at 16:18

## 2021-02-07 RX ADMIN — SODIUM CHLORIDE, SODIUM LACTATE, POTASSIUM CHLORIDE, AND CALCIUM CHLORIDE: .6; .31; .03; .02 INJECTION, SOLUTION INTRAVENOUS at 16:27

## 2021-02-07 RX ADMIN — LIDOCAINE HYDROCHLORIDE 5 ML: 20 INJECTION, SOLUTION INFILTRATION; PERINEURAL at 16:22

## 2021-02-07 RX ADMIN — FAMOTIDINE 20 MG: 10 INJECTION, SOLUTION INTRAVENOUS at 15:54

## 2021-02-07 RX ADMIN — SUCCINYLCHOLINE CHLORIDE 100 MG: 20 INJECTION, SOLUTION INTRAMUSCULAR; INTRAVENOUS at 16:22

## 2021-02-07 RX ADMIN — SODIUM CITRATE AND CITRIC ACID MONOHYDRATE 30 ML: 500; 334 SOLUTION ORAL at 15:52

## 2021-02-07 RX ADMIN — OXYTOCIN 20 UNITS: 10 INJECTION INTRAVENOUS at 16:45

## 2021-02-07 RX ADMIN — DOXYCYCLINE 100 MG: 100 INJECTION, POWDER, LYOPHILIZED, FOR SOLUTION INTRAVENOUS at 15:54

## 2021-02-07 ASSESSMENT — PULMONARY FUNCTION TESTS
PIF_VALUE: 1
PIF_VALUE: 3
PIF_VALUE: 0
PIF_VALUE: 6
PIF_VALUE: 5
PIF_VALUE: 0
PIF_VALUE: 3
PIF_VALUE: 3
PIF_VALUE: 16
PIF_VALUE: 3
PIF_VALUE: 14
PIF_VALUE: 0
PIF_VALUE: 5
PIF_VALUE: 0
PIF_VALUE: 4
PIF_VALUE: 0
PIF_VALUE: 6
PIF_VALUE: 4
PIF_VALUE: 16
PIF_VALUE: 5
PIF_VALUE: 6
PIF_VALUE: 3
PIF_VALUE: 0
PIF_VALUE: 14
PIF_VALUE: 0
PIF_VALUE: 7
PIF_VALUE: 16
PIF_VALUE: 6
PIF_VALUE: 5
PIF_VALUE: 0
PIF_VALUE: 0

## 2021-02-07 ASSESSMENT — PAIN DESCRIPTION - LOCATION: LOCATION: ABDOMEN

## 2021-02-07 ASSESSMENT — PAIN SCALES - GENERAL: PAINLEVEL_OUTOF10: 5

## 2021-02-07 ASSESSMENT — PAIN DESCRIPTION - DESCRIPTORS: DESCRIPTORS: CRAMPING

## 2021-02-07 NOTE — FLOWSHEET NOTE
Pt back from bathroom, able to void. Pt sitting up in bed drinking water. Denies pain or nausea. Denies needing anything at this time, call light within reach.

## 2021-02-07 NOTE — H&P
Department of Gynecology History and Physical      CHIEF COMPLAINT: Vaginal bleeding    History obtained from patient    HISTORY OF PRESENT ILLNESS:    The patient is a 46 y.o. [id number] female presenting with vaginal bleeding. She was diagnosed with a missed  earlier this month and took a dose of cytotec approximately 7 days ago. She bled heavily at that time with passage of tissue. Bleeding lessened in quantity until yesterday, when she noted passage of bright red blood filling a pad every 3-4 hours. Today, she noted passage of clots which have ranged in size between a golf ball and tennis ball. Bleeding is filling a pad every 15 minutes when ambulating, lessened when resting. Reports mild cramping. No fever, chills, n/v, lightheadedness. Past Medical History:        Diagnosis Date    Anxiety     Migraines     Miscarriage      Past Surgical History:        Procedure Laterality Date     SECTION  13, 2016    Boy, Girl    WISDOM TOOTH EXTRACTION         Meds:  Current Facility-Administered Medications:     doxycycline (VIBRAMYCIN) 100 mg in dextrose 5 % 100 mL IVPB, 100 mg, Intravenous, Q12H, Aly Duenas MD    Current Outpatient Medications:     Prenatal Vit-Fe Fumarate-FA (PRENATAL VITAMIN) 27-0.8 MG TABS, Take 1 tablet by mouth daily, Disp: , Rfl:     sertraline (ZOLOFT) 50 MG tablet, TAKE 1 TABLET BY MOUTH DAILY, Disp: 90 tablet, Rfl: 3    SUMAtriptan (IMITREX) 50 MG tablet, TAKE ONE TABLET BY MOUTH AT ONSET OF HEADACHE; MAY REPEAT ONE TABLET IN 2 HOURS IF NEEDED. MAX OF 2 TABLETS A DAY (Patient not taking: Reported on 12/3/2020), Disp: 9 tablet, Rfl: 5     Allergies:  Patient has no known allergies.      Social History:      Family History:       Problem Relation Age of Onset    High Cholesterol Father        ROS:     General ROS: negative  Respiratory ROS: no cough, SOB or wheezing  Cardiovascular ROS: no chest pain or dyspnea on exertion  Gastrointestinal ROS: no abdominal pain, change in bowel habits, or black or bloody stools  Genito-Urinary ROS: no dysuria, trouble voiding, or hematuria  Musculoskeletal ROS: negative    PHYSICAL EXAM:    Vitals:  BP (!) 156/76   Pulse 69   Temp 98.2 °F (36.8 °C) (Temporal)   Resp 18   Wt 159 lb 13.3 oz (72.5 kg)   LMP 10/28/2020   SpO2 97%   BMI 28.31 kg/m²     CONSTITUTIONAL:  awake, alert, cooperative, no apparent distress, and appears stated age  NECK:  Supple, symmetrical, trachea midline, no adenopathy, thyroid symmetric, not enlarged and no tenderness, skin normal  HEMATOLOGIC/LYMPHATICS:  no cervical lymphadenopathy  BACK:  symmetric  LUNGS:  No increased work of breathing, good air exchange  CARDIOVASCULAR: No periferal edema  ABDOMEN:  Soft, non-distended, non-tender, no masses palpated, no hepatosplenomegally  MUSCULOSKELETAL:  tone is normal  NEUROLOGIC:  Awake, alert, oriented to name, place and time. SKIN:  normal skin color, texture, turgor  External Genitalia: Bright red blood visible on vulva and medial thighs. Otherwise normal in appearance. Cervix: Bright red blood fills speculum on exam. Cervix visually 1cm and this was confirmed on bimanual exam. No tenderness. Uterus:  Approximately 8 week size, soft, nontender  [place]a: no fullness or tenderness    Labs:    CBC:   Lab Results   Component Value Date    WBC 9.7 2021    RBC 4.00 2021    HGB 12.0 2021    HCT 35.7 2021    MCV 89.1 2021    RDW 12.8 2021     2021     CMP:    Lab Results   Component Value Date     2021    K 3.8 2021     2021    CO2 21 2021    BUN 14 2021    PROT 7.0 2021    PROT 7.4 2013     Imaging: not performed    ASSESSMENT AND PLAN:    Incomplete  s/p medical management with cytotec, now presenting with heavy vaginal bleeding. She is hemodynamically stable.  However, given severity of her bleeding, I recommend proceeding to the OR for suction D&C. Risks and benefits were discussed with the patient at length, including but not limited to: the risk of hemorrhage possibly requiring blood transfusion which carries risk of transmission of infection such as HIV; infection; uterine perforation which carries a risk of injury to abdominal/pelvic organs possibly requiring laparoscopy/laparotomy; and uterine scarring which can impact future fertility. Informed consent was obtained. She consents to blood transfusion if indicated. PLAN  - To OR for suction D&C. Routine orders.   - Doxycycline 100mg IV prior to OR.   - T&S and rapid COVID testing pending. Hgb 12.     Electronically signed by Kierra Robertson MD on 2/7/2021 at 3:37 PM

## 2021-02-07 NOTE — ANESTHESIA POSTPROCEDURE EVALUATION
Department of Anesthesiology  Postprocedure Note    Patient: Dari Moncada  MRN: [id number]  YOB: 1980  Date of evaluation: 2/7/2021  Time:  5:10 PM     Procedure Summary     Date: 02/07/21 Room / Location:     Anesthesia Start: 1613 Anesthesia Stop: 1707    Procedure: Procedure Not Yet Scheduled Diagnosis:     Scheduled Providers:  Responsible Provider: Magda George MD    Anesthesia Type: general ASA Status: 2 - Emergent          Anesthesia Type: general    Ramos Phase I:      Ramos Phase II:      Last vitals: Reviewed and per EMR flowsheets.        Anesthesia Post Evaluation    Patient location during evaluation: PACU  Patient participation: complete - patient participated  Level of consciousness: awake and alert  Pain score: 0  Nausea & Vomiting: no nausea and no vomiting  Complications: no  Cardiovascular status: hemodynamically stable  Respiratory status: acceptable  Hydration status: stable

## 2021-02-07 NOTE — ANESTHESIA PRE PROCEDURE
Department of Anesthesiology  Preprocedure Note       Name:  Naseem Clarke   Age:  46 y.o.  :  1980                                          MRN:  [id number]         Date:  2021      Surgeon: * No surgeons listed *    Procedure: * No procedures listed *    Medications prior to admission:   Prior to Admission medications    Medication Sig Start Date End Date Taking? Authorizing Provider   Prenatal Vit-Fe Fumarate-FA (PRENATAL VITAMIN) 27-0.8 MG TABS Take 1 tablet by mouth daily   Yes Historical Provider, MD   sertraline (ZOLOFT) 50 MG tablet TAKE 1 TABLET BY MOUTH DAILY 10/20/20  Yes Armen Castillo MD   SUMAtriptan (IMITREX) 50 MG tablet TAKE ONE TABLET BY MOUTH AT ONSET OF HEADACHE; MAY REPEAT ONE TABLET IN 2 HOURS IF NEEDED.  MAX OF 2 TABLETS A DAY 20  Yes Armen Castillo MD       Current medications:    Current Facility-Administered Medications   Medication Dose Route Frequency Provider Last Rate Last Admin    doxycycline (VIBRAMYCIN) 100 mg in dextrose 5 % 100 mL IVPB  100 mg Intravenous Q12H Salas Young  mL/hr at 21 1554 100 mg at 21 1554    lactated ringers infusion   Intravenous Continuous Salas Young MD        [COMPLETED] lactated ringers bolus  1,000 mL Intravenous Once Salas Young MD 1,000 mL/hr at 21 1545 1,000 mL at 21 1545    sodium chloride flush 0.9 % injection 10 mL  10 mL Intravenous 2 times per day Salas Young MD        sodium chloride flush 0.9 % injection 10 mL  10 mL Intravenous PRN Salas Young MD        famotidine (PEPCID) injection 20 mg  20 mg Intravenous BID PATT Brown - CRNA   20 mg at 21 1554    metoclopramide (REGLAN) injection 10 mg  10 mg Intravenous Q6H PATT Brown - CRNA   10 mg at 21 1552       Allergies:  No Known Allergies    Problem List:    Patient Active Problem List   Diagnosis Code    Migraines G43.909    Anxiety F41.9 Past Medical History:        Diagnosis Date    Anxiety     Migraines     Miscarriage        Past Surgical History:        Procedure Laterality Date     SECTION  13, 2016    Boy, Girl    WISDOM TOOTH EXTRACTION         Social History:    Social History     Tobacco Use    Smoking status: Never Smoker    Smokeless tobacco: Never Used   Substance Use Topics    Alcohol use: Not Currently                                Counseling given: Not Answered      Vital Signs (Current):   Vitals:    21 1442 21 1444 21 1549 21 1556   BP:  (!) 156/76  (!) 144/74   Pulse:  69  64   Resp:  18  16   Temp:  36.8 °C (98.2 °F)     TempSrc:  Temporal     SpO2:  97%     Weight: 159 lb 13.3 oz (72.5 kg)  160 lb (72.6 kg)    Height:   5' 3\" (1.6 m)                                               BP Readings from Last 3 Encounters:   21 (!) 144/74   20 110/60   20 120/80       NPO Status: Time of last liquid consumption: 1100                        Time of last solid consumption: 1100                        Date of last liquid consumption: 21                        Date of last solid food consumption: 21    BMI:   Wt Readings from Last 3 Encounters:   21 160 lb (72.6 kg)   20 154 lb (69.9 kg)   20 154 lb (69.9 kg)     Body mass index is 28.34 kg/m².     CBC:   Lab Results   Component Value Date    WBC 9.7 2021    RBC 4.00 2021    HGB 12.0 2021    HCT 35.7 2021    MCV 89.1 2021    RDW 12.8 2021     2021       CMP:   Lab Results   Component Value Date     2021    K 3.8 2021     2021    CO2 21 2021    BUN 14 2021    CREATININE 0.5 2021    GFRAA >60 2021    GFRAA >60 2013    AGRATIO 1.4 2021    LABGLOM >60 2021    GLUCOSE 123 2021    PROT 7.0 2021    PROT 7.4 2013    CALCIUM 8.6 2021    BILITOT 0.7 02/07/2021    ALKPHOS 104 02/07/2021    AST 24 02/07/2021    ALT 22 02/07/2021       POC Tests: No results for input(s): POCGLU, POCNA, POCK, POCCL, POCBUN, POCHEMO, POCHCT in the last 72 hours. Coags: No results found for: PROTIME, INR, APTT    HCG (If Applicable):   Lab Results   Component Value Date    PREGTESTUR Negative 06/14/2018        ABGs: No results found for: PHART, PO2ART, TXU1EUP, BCN3RMN, BEART, R9XNIIPS     Type & Screen (If Applicable):  No results found for: LABABO, LABRH    Drug/Infectious Status (If Applicable):  No results found for: HIV, HEPCAB    COVID-19 Screening (If Applicable):   Lab Results   Component Value Date    COVID19 Not Detected 02/07/2021         Anesthesia Evaluation  Patient summary reviewed  Airway: Mallampati: II  TM distance: >3 FB   Neck ROM: full  Mouth opening: > = 3 FB Dental: normal exam         Pulmonary:Negative Pulmonary ROS and normal exam                               Cardiovascular:Negative CV ROS                      Neuro/Psych:   Negative Neuro/Psych ROS              GI/Hepatic/Renal: Neg GI/Hepatic/Renal ROS            Endo/Other: Negative Endo/Other ROS                    Abdominal:           Vascular: negative vascular ROS.  + PVD, aortic or cerebral, . Anesthesia Plan      general     ASA 2 - emergent             Anesthetic plan and risks discussed with patient. Use of blood products discussed with patient whom consented to blood products. [facility] Department of Anesthesiology  Pre-Anesthesia Evaluation/Consultation       Name:  Rubén Medina                                         Age:  46 y.o.   MRN:  [id number]           Procedure (Scheduled):  GETA for D&C  Surgeon:  Dr. Qamar March     No Known Allergies  Patient Active Problem List   Diagnosis    Migraines    Anxiety     Past Medical History:   Diagnosis Date    Anxiety     Migraines     Miscarriage 2021     Past Surgical History: Procedure Laterality Date     SECTION  13, 2016    Boy, Girl    WISDOM TOOTH EXTRACTION       Social History     Tobacco Use    Smoking status: Never Smoker    Smokeless tobacco: Never Used   Substance Use Topics    Alcohol use: Not Currently    Drug use: No     Medications  No current facility-administered medications on file prior to encounter. Current Outpatient Medications on File Prior to Encounter   Medication Sig Dispense Refill    Prenatal Vit-Fe Fumarate-FA (PRENATAL VITAMIN) 27-0.8 MG TABS Take 1 tablet by mouth daily      sertraline (ZOLOFT) 50 MG tablet TAKE 1 TABLET BY MOUTH DAILY 90 tablet 3    SUMAtriptan (IMITREX) 50 MG tablet TAKE ONE TABLET BY MOUTH AT ONSET OF HEADACHE; MAY REPEAT ONE TABLET IN 2 HOURS IF NEEDED.  MAX OF 2 TABLETS A DAY 9 tablet 5     Current Facility-Administered Medications   Medication Dose Route Frequency Provider Last Rate Last Admin    doxycycline (VIBRAMYCIN) 100 mg in dextrose 5 % 100 mL IVPB  100 mg Intravenous Q12H Balwinder Patel  mL/hr at 21 1554 100 mg at 21 1554    lactated ringers infusion   Intravenous Continuous Balwinder Patel MD        [COMPLETED] lactated ringers bolus  1,000 mL Intravenous Once Balwinder Patel MD 1,000 mL/hr at 21 1545 1,000 mL at 21 1545    sodium chloride flush 0.9 % injection 10 mL  10 mL Intravenous 2 times per day Balwinder Patel MD        sodium chloride flush 0.9 % injection 10 mL  10 mL Intravenous PRN Balwinder Patel MD        famotidine (PEPCID) injection 20 mg  20 mg Intravenous BID Alessia Ends Claudetta Curet, APRN - CRNA   20 mg at 21 1554    metoclopramide (REGLAN) injection 10 mg  10 mg Intravenous Q6H Alessia Ends Claudetta Curet, APRN - CRNA   10 mg at 21 1552     Vital Signs (Current)   Vitals:    21 1556   BP: (!) 144/74   Pulse: 64   Resp: 16   Temp:    SpO2:      Vital Signs Statistics (for past 48 hrs)     Temp  Av.8 °C (98.2 °F) Min: 36.8 °C (98.2 °F)   Min taken time: 21 1444  Max: 36.8 °C (98.2 °F)   Max taken time: 21 1444  Pulse  Av.5  Min: 59   Min taken time: 21 1556  Max: 71   Max taken time: 21 1444  Resp  Av  Min: 12   Min taken time: 21 1556  Max: 18   Max taken time: 21 1444  BP  Min: 144/74   Min taken time: 21 1556  Max: 156/76   Max taken time: 21 1444  SpO2  Av %  Min: 97 %   Min taken time: 21 1444  Max: 97 %   Max taken time: 21 1444    BP Readings from Last 3 Encounters:   21 (!) 144/74   20 110/60   20 120/80     BMI  Body mass index is 28.34 kg/m². Estimated body mass index is 28.34 kg/m² as calculated from the following:    Height as of this encounter: 5' 3\" (1.6 m). Weight as of this encounter: 160 lb (72.6 kg).     CBC   Lab Results   Component Value Date    WBC 9.7 2021    RBC 4.00 2021    HGB 12.0 2021    HCT 35.7 2021    MCV 89.1 2021    RDW 12.8 2021     2021     CMP    Lab Results   Component Value Date     2021    K 3.8 2021     2021    CO2 21 2021    BUN 14 2021    CREATININE 0.5 2021    GFRAA >60 2021    GFRAA >60 2013    AGRATIO 1.4 2021    LABGLOM >60 2021    GLUCOSE 123 2021    PROT 7.0 2021    PROT 7.4 2013    CALCIUM 8.6 2021    BILITOT 0.7 2021    ALKPHOS 104 2021    AST 24 2021    ALT 22 2021     BMP    Lab Results   Component Value Date     2021    K 3.8 2021     2021    CO2 21 2021    BUN 14 2021    CREATININE 0.5 2021    CALCIUM 8.6 2021    GFRAA >60 2021    GFRAA >60 2013    LABGLOM >60 2021    GLUCOSE 123 2021     POCGlucose  Recent Labs     21  1503   GLUCOSE 123*      Coags  No results found for: PROTIME, INR, APTT  HCG (If Applicable)   Lab Results Component Value Date    PREGTESTUR Negative 06/14/2018      ABGs No results found for: PHART, PO2ART, QGF3JZT, DPJ9AEN, BEART, Q9PYRKJS   Type & Screen (If Applicable)  No results found for: Ezzie Merlin, [address] [place] [address], APRN - CRNA   2/7/2021

## 2021-02-07 NOTE — ED TRIAGE NOTES
Pt sent here by her OBGYN with CC of heavy bleeding after a miscarriage and receiving cytotec. She states that over the past two days she has had intermittent periods of heavy bleeding and then very minimal bleeding. She does have lower abdominal cramping.   She estimates herself going through 2-3 pads per hour

## 2021-02-07 NOTE — ED PROVIDER NOTES
or more for level 5)     General: No fever or chills. Eyes: No blurred vision. ENT: No nasal congestion or sore throat. Cardiovascular: No chest pain. Pulmonary: No shortness of breath or cough. GI: Lower abdominal cramping like heavy menstrual cramps. She states she had a little bit of discomfort in her left lower abdomen today. None at this time. : Vaginal bleeding as described above. No dysuria or frequency. Neuro: No dizziness or passing out. Except as noted above the remainder of the review of systems was reviewed and negative. PAST MEDICAL HISTORY     Past Medical History:   Diagnosis Date    Anxiety     Migraines     Miscarriage          SURGICAL HISTORY       Past Surgical History:   Procedure Laterality Date     SECTION  13, 2016    Boy, Girl    WISDOM TOOTH EXTRACTION           CURRENT MEDICATIONS       Previous Medications    PRENATAL VIT-FE FUMARATE-FA (PRENATAL VITAMIN) 27-0.8 MG TABS    Take 1 tablet by mouth daily    SERTRALINE (ZOLOFT) 50 MG TABLET    TAKE 1 TABLET BY MOUTH DAILY    SUMATRIPTAN (IMITREX) 50 MG TABLET    TAKE ONE TABLET BY MOUTH AT ONSET OF HEADACHE; MAY REPEAT ONE TABLET IN 2 HOURS IF NEEDED. MAX OF 2 TABLETS A DAY       ALLERGIES     Patient has no known allergies.     FAMILY HISTORY       Family History   Problem Relation Age of Onset    High Cholesterol Father           SOCIAL HISTORY       Social History     Socioeconomic History    Marital status:      Spouse name: None    Number of children: None    Years of education: None    Highest education level: None   Occupational History    Occupation:    Social Needs    Financial resource strain: Patient refused    Food insecurity     Worry: Patient refused     Inability: Patient refused    Transportation needs     Medical: Patient refused     Non-medical: Patient refused   Tobacco Use    Smoking status: Never Smoker    Smokeless tobacco: Never Used Substance and Sexual Activity    Alcohol use: No    Drug use: No    Sexual activity: None   Lifestyle    Physical activity     Days per week: None     Minutes per session: None    Stress: None   Relationships    Social connections     Talks on phone: None     Gets together: None     Attends Voodoo service: None     Active member of club or organization: None     Attends meetings of clubs or organizations: None     Relationship status: None    Intimate partner violence     Fear of current or ex partner: None     Emotionally abused: None     Physically abused: None     Forced sexual activity: None   Other Topics Concern    None   Social History Narrative    None         PHYSICAL EXAM    (up to 7 for level 4, 8 or more for level 5)     ED Triage Vitals   BP Temp Temp Source Pulse Resp SpO2 Height Weight   21 1444 21 1444 21 1444 21 1444 21 1444 21 1444 -- 21 1442   (!) 156/76 98.2 °F (36.8 °C) Temporal 69 18 97 %  159 lb 13.3 oz (72.5 kg)       General: Alert white female no acute distress. Head: Atraumatic and normocephalic. Eyes: No conjunctival injection. Pupils equal round reactive. Extraocular movements are intact. No pallor. ENT: Darleen Said is clear. Oropharynx moist without erythema. Neck: Supple without adenopathy. Nontender. No thyromegaly. Heart: Regular rate and rhythm. No murmurs or gallops noted. Lungs: Breath sounds equal bilaterally and clear. Abdomen: Soft, nondistended, nontender. No masses organomegaly. Bowel sounds are normal.  No flank tenderness. Musculoskeletal: No lower extremity edema. No calf tenderness. Intact symmetrical distal pulses. Skin: Warm and dry, good turgor. No pallor or cyanosis. Neuro: Awake, alert, oriented. No focal motor deficits. Normal gait.       DIFFERENTIAL DIAGNOSIS   Differential includes but is not limited to missed , incomplete , ectopic pregnancy      DIAGNOSTIC RESULTS     EKG: PATIENT REFERRED TO:  No follow-up provider specified.     DISCHARGE MEDICATIONS:  New Prescriptions    No medications on file       (Please note that portions of this note were completed with a voice recognition program.  Efforts were made to edit the dictations but occasionally words are mis-transcribed.)    Pilar Urias MD  Attending Emergency Physician        Jacqueline Bravo MD  02/08/21 0005

## 2021-02-07 NOTE — OP NOTE
OPERATIVE REPORT  Pre-operative Diagnosis: Missed    Post-operative Diagnosis: Same   Procedure: Suction D&C  Anesthesia: GETA  Surgeon: Ynes Dowell MD   Antibiotics: Doxycycline   Findings: Anteverted, anteflexed uterus. Cervix dilated 1 cm prior to procedure. Following procedure, clear endometrial stripe on bedside ultrasound and good uterine cry. Complications: none  Specimens: Products of conception   UOP: 0mL  EBL: 100mL  IVF: 1000mL    PROCEDURE:   Eleni Page is a 46 y.o. female  female presenting with heavy vaginal bleeding after taking cytotec for a missed . She was hemodynamically stable. However, due to heavy vaginal bleeding, I recommended we proceed with dilation and curettage. Risks and benefits were discussed with the patient at length, including but not limited to: the risk of hemorrhage possibly requiring blood transfusion which carries risk of transmission of infection such as HIV; infection; uterine perforation which carries a risk of injury to abdominal/pelvic organs possibly requiring laparoscopy/laparotomy; and uterine scarring which can impact future fertility. Informed consent was obtained. The patient was taken to the operating room where anesthesia was obtained without difficulty. She was prepped and draped in lithotomy position. A time out was performed. Castellon retractors were placed inside the vagina and the cervix was visualized with large amount of bright red blood. The anterior lip of the cervix was grasped with a single toothed tenaculum. The cervix was noted to be 1 cm dilated and able to accommodate a 12 mm suction curette. The suction curette was advanced to the uterine fundus. The suction device was activated and products of conception were evacuated from the uterus. Bleeding immediately lessened. Sharp curettage followed.  The suction curette and sharp curette were used until all tissue had been removed, as evidenced by a gritty texture and clear endometrial stripe on ultrasound. All instruments were removed from the vagina. Silver nitrite and pressure were used to achieve hemostasis at the site of the tenaculum. Counts were correct. The patient tolerated the procedure well.      Rock Nielson  2/7/2021

## 2021-02-07 NOTE — ED NOTES
Pt transported up to L&D in W/C via L&D RN and OBGYN for [facility]  with  at [facility].        Natasha Trimble, JAKE  02/07/21 1082

## 2021-02-07 NOTE — FLOWSHEET NOTE
Pt taken from triage room to OR via stretcher for D&C. Pt's spouse waiting in triage room, aware of POC.

## 2021-02-07 NOTE — FLOWSHEET NOTE
Blood bank called for type and screen results. Pt A+ with a positive antibody. Pt will need additional blood tubes drawn for additional testing. Dr. Hadassah Osgood aware and proceeding with dilation and curettage for emergent care.

## 2021-07-23 RX ORDER — SUMATRIPTAN 50 MG/1
TABLET, FILM COATED ORAL
Qty: 9 TABLET | Refills: 4 | Status: SHIPPED | OUTPATIENT
Start: 2021-07-23 | End: 2022-10-31 | Stop reason: ALTCHOICE

## 2021-08-18 LAB
ABO, EXTERNAL RESULT: NORMAL
C. TRACHOMATIS, EXTERNAL RESULT: NEGATIVE
HEP B, EXTERNAL RESULT: NEGATIVE
HIV, EXTERNAL RESULT: NEGATIVE
N. GONORRHOEAE, EXTERNAL RESULT: NEGATIVE
RH FACTOR, EXTERNAL RESULT: POSITIVE
RPR, EXTERNAL RESULT: NORMAL
RUBELLA TITER, EXTERNAL RESULT: NORMAL

## 2022-02-23 ENCOUNTER — ANESTHESIA (OUTPATIENT)
Dept: LABOR AND DELIVERY | Age: 42
End: 2022-02-23

## 2022-02-23 ENCOUNTER — ANESTHESIA EVENT (OUTPATIENT)
Dept: LABOR AND DELIVERY | Age: 42
End: 2022-02-23

## 2022-02-23 ENCOUNTER — HOSPITAL ENCOUNTER (INPATIENT)
Age: 42
LOS: 1 days | Discharge: HOME | DRG: 832 | End: 2022-02-24
Attending: OBSTETRICS & GYNECOLOGY | Admitting: OBSTETRICS & GYNECOLOGY
Payer: COMMERCIAL

## 2022-02-23 PROBLEM — O47.03 THREATENED PRETERM LABOR, THIRD TRIMESTER: Status: ACTIVE | Noted: 2022-02-23

## 2022-02-23 LAB
A/G RATIO: 1.2 (ref 1.1–2.2)
ABO/RH: NORMAL
ALBUMIN SERPL-MCNC: 3.6 G/DL (ref 3.4–5)
ALP BLD-CCNC: 114 U/L (ref 40–129)
ALT SERPL-CCNC: 9 U/L (ref 10–40)
AMPHETAMINE SCREEN, URINE: NORMAL
ANION GAP SERPL CALCULATED.3IONS-SCNC: 14 MMOL/L (ref 3–16)
ANTIBODY SCREEN: NORMAL
APTT: 30.1 SEC (ref 26.2–38.6)
AST SERPL-CCNC: 16 U/L (ref 15–37)
BARBITURATE SCREEN URINE: NORMAL
BASOPHILS ABSOLUTE: 0 K/UL (ref 0–0.2)
BASOPHILS RELATIVE PERCENT: 0.4 %
BENZODIAZEPINE SCREEN, URINE: NORMAL
BILIRUB SERPL-MCNC: 0.5 MG/DL (ref 0–1)
BUN BLDV-MCNC: 12 MG/DL (ref 7–20)
BUPRENORPHINE URINE: NORMAL
CALCIUM SERPL-MCNC: 8.7 MG/DL (ref 8.3–10.6)
CANNABINOID SCREEN URINE: NORMAL
CHLORIDE BLD-SCNC: 104 MMOL/L (ref 99–110)
CO2: 19 MMOL/L (ref 21–32)
COCAINE METABOLITE SCREEN URINE: NORMAL
CREAT SERPL-MCNC: <0.5 MG/DL (ref 0.6–1.1)
CREATININE URINE: 197.2 MG/DL (ref 28–259)
EOSINOPHILS ABSOLUTE: 0.1 K/UL (ref 0–0.6)
EOSINOPHILS RELATIVE PERCENT: 0.6 %
GFR AFRICAN AMERICAN: >60
GFR NON-AFRICAN AMERICAN: >60
GLUCOSE BLD-MCNC: 91 MG/DL (ref 70–99)
HCT VFR BLD CALC: 35.6 % (ref 36–48)
HEMOGLOBIN: 12.1 G/DL (ref 12–16)
INR BLD: 0.97 (ref 0.88–1.12)
LYMPHOCYTES ABSOLUTE: 2.1 K/UL (ref 1–5.1)
LYMPHOCYTES RELATIVE PERCENT: 20.5 %
Lab: NORMAL
MCH RBC QN AUTO: 30.2 PG (ref 26–34)
MCHC RBC AUTO-ENTMCNC: 34.1 G/DL (ref 31–36)
MCV RBC AUTO: 88.6 FL (ref 80–100)
METHADONE SCREEN, URINE: NORMAL
MONOCYTES ABSOLUTE: 0.5 K/UL (ref 0–1.3)
MONOCYTES RELATIVE PERCENT: 4.7 %
NEUTROPHILS ABSOLUTE: 7.5 K/UL (ref 1.7–7.7)
NEUTROPHILS RELATIVE PERCENT: 73.8 %
OPIATE SCREEN URINE: NORMAL
OXYCODONE URINE: NORMAL
PDW BLD-RTO: 12.8 % (ref 12.4–15.4)
PH UA: 7
PHENCYCLIDINE SCREEN URINE: NORMAL
PLATELET # BLD: 224 K/UL (ref 135–450)
PMV BLD AUTO: 7.9 FL (ref 5–10.5)
POTASSIUM SERPL-SCNC: 3.9 MMOL/L (ref 3.5–5.1)
PROPOXYPHENE SCREEN: NORMAL
PROTEIN PROTEIN: 41 MG/DL
PROTEIN/CREAT RATIO: 0.2 MG/DL
PROTHROMBIN TIME: 11 SEC (ref 9.9–12.7)
RBC # BLD: 4.02 M/UL (ref 4–5.2)
REASON FOR REJECTION: NORMAL
REJECTED TEST: NORMAL
SARS-COV-2, NAAT: NOT DETECTED
SODIUM BLD-SCNC: 137 MMOL/L (ref 136–145)
TOTAL PROTEIN: 6.6 G/DL (ref 6.4–8.2)
TOTAL SYPHILLIS IGG/IGM: NORMAL
WBC # BLD: 10.2 K/UL (ref 4–11)

## 2022-02-23 PROCEDURE — 87635 SARS-COV-2 COVID-19 AMP PRB: CPT

## 2022-02-23 PROCEDURE — 84156 ASSAY OF PROTEIN URINE: CPT

## 2022-02-23 PROCEDURE — 96372 THER/PROPH/DIAG INJ SC/IM: CPT

## 2022-02-23 PROCEDURE — 80307 DRUG TEST PRSMV CHEM ANLYZR: CPT

## 2022-02-23 PROCEDURE — 82570 ASSAY OF URINE CREATININE: CPT

## 2022-02-23 PROCEDURE — 85610 PROTHROMBIN TIME: CPT

## 2022-02-23 PROCEDURE — 2580000003 HC RX 258: Performed by: OBSTETRICS & GYNECOLOGY

## 2022-02-23 PROCEDURE — 86901 BLOOD TYPING SEROLOGIC RH(D): CPT

## 2022-02-23 PROCEDURE — 6370000000 HC RX 637 (ALT 250 FOR IP): Performed by: OBSTETRICS & GYNECOLOGY

## 2022-02-23 PROCEDURE — 86850 RBC ANTIBODY SCREEN: CPT

## 2022-02-23 PROCEDURE — 85730 THROMBOPLASTIN TIME PARTIAL: CPT

## 2022-02-23 PROCEDURE — 59025 FETAL NON-STRESS TEST: CPT

## 2022-02-23 PROCEDURE — 85025 COMPLETE CBC W/AUTO DIFF WBC: CPT

## 2022-02-23 PROCEDURE — 86780 TREPONEMA PALLIDUM: CPT

## 2022-02-23 PROCEDURE — 6360000002 HC RX W HCPCS: Performed by: OBSTETRICS & GYNECOLOGY

## 2022-02-23 PROCEDURE — 80053 COMPREHEN METABOLIC PANEL: CPT

## 2022-02-23 PROCEDURE — 86900 BLOOD TYPING SEROLOGIC ABO: CPT

## 2022-02-23 PROCEDURE — 1220000000 HC SEMI PRIVATE OB R&B

## 2022-02-23 RX ORDER — ACETAMINOPHEN 325 MG/1
650 TABLET ORAL EVERY 4 HOURS PRN
Status: DISCONTINUED | OUTPATIENT
Start: 2022-02-23 | End: 2022-02-24 | Stop reason: HOSPADM

## 2022-02-23 RX ORDER — FAMOTIDINE 20 MG/1
20 TABLET, FILM COATED ORAL 2 TIMES DAILY
COMMUNITY
End: 2022-10-31 | Stop reason: ALTCHOICE

## 2022-02-23 RX ORDER — SODIUM CHLORIDE 0.9 % (FLUSH) 0.9 %
5-40 SYRINGE (ML) INJECTION EVERY 12 HOURS SCHEDULED
Status: DISCONTINUED | OUTPATIENT
Start: 2022-02-23 | End: 2022-02-24 | Stop reason: HOSPADM

## 2022-02-23 RX ORDER — ASPIRIN 81 MG/1
81 TABLET, CHEWABLE ORAL DAILY
Status: DISCONTINUED | OUTPATIENT
Start: 2022-02-23 | End: 2022-02-24 | Stop reason: HOSPADM

## 2022-02-23 RX ORDER — ACETAMINOPHEN 325 MG/1
650 TABLET ORAL EVERY 4 HOURS PRN
Status: DISCONTINUED | OUTPATIENT
Start: 2022-02-23 | End: 2022-02-23 | Stop reason: SDUPTHER

## 2022-02-23 RX ORDER — NIFEDIPINE 10 MG/1
30 CAPSULE ORAL ONCE
Status: COMPLETED | OUTPATIENT
Start: 2022-02-23 | End: 2022-02-23

## 2022-02-23 RX ORDER — NIFEDIPINE 10 MG/1
10 CAPSULE ORAL EVERY 6 HOURS SCHEDULED
Status: DISCONTINUED | OUTPATIENT
Start: 2022-02-23 | End: 2022-02-23

## 2022-02-23 RX ORDER — BETAMETHASONE SODIUM PHOSPHATE AND BETAMETHASONE ACETATE 3; 3 MG/ML; MG/ML
12 INJECTION, SUSPENSION INTRA-ARTICULAR; INTRALESIONAL; INTRAMUSCULAR; SOFT TISSUE EVERY 24 HOURS
Status: COMPLETED | OUTPATIENT
Start: 2022-02-23 | End: 2022-02-24

## 2022-02-23 RX ORDER — SODIUM CHLORIDE, SODIUM LACTATE, POTASSIUM CHLORIDE, CALCIUM CHLORIDE 600; 310; 30; 20 MG/100ML; MG/100ML; MG/100ML; MG/100ML
INJECTION, SOLUTION INTRAVENOUS CONTINUOUS
Status: DISCONTINUED | OUTPATIENT
Start: 2022-02-23 | End: 2022-02-24 | Stop reason: HOSPADM

## 2022-02-23 RX ORDER — ONDANSETRON 2 MG/ML
4 INJECTION INTRAMUSCULAR; INTRAVENOUS EVERY 6 HOURS PRN
Status: DISCONTINUED | OUTPATIENT
Start: 2022-02-23 | End: 2022-02-24 | Stop reason: HOSPADM

## 2022-02-23 RX ORDER — DOCUSATE SODIUM 100 MG/1
100 CAPSULE, LIQUID FILLED ORAL 2 TIMES DAILY
Status: DISCONTINUED | OUTPATIENT
Start: 2022-02-23 | End: 2022-02-24 | Stop reason: HOSPADM

## 2022-02-23 RX ORDER — PRENATAL VIT/IRON FUM/FOLIC AC 27MG-0.8MG
1 TABLET ORAL DAILY
Status: DISCONTINUED | OUTPATIENT
Start: 2022-02-24 | End: 2022-02-24 | Stop reason: HOSPADM

## 2022-02-23 RX ORDER — SODIUM CHLORIDE, SODIUM LACTATE, POTASSIUM CHLORIDE, AND CALCIUM CHLORIDE .6; .31; .03; .02 G/100ML; G/100ML; G/100ML; G/100ML
500 INJECTION, SOLUTION INTRAVENOUS PRN
Status: DISCONTINUED | OUTPATIENT
Start: 2022-02-23 | End: 2022-02-24 | Stop reason: HOSPADM

## 2022-02-23 RX ORDER — NIFEDIPINE 10 MG/1
10 CAPSULE ORAL EVERY 6 HOURS SCHEDULED
Status: DISCONTINUED | OUTPATIENT
Start: 2022-02-23 | End: 2022-02-24 | Stop reason: HOSPADM

## 2022-02-23 RX ORDER — ASPIRIN 81 MG/1
81 TABLET, CHEWABLE ORAL DAILY
Status: ON HOLD | COMMUNITY
End: 2022-03-30 | Stop reason: HOSPADM

## 2022-02-23 RX ORDER — SODIUM CHLORIDE 9 MG/ML
25 INJECTION, SOLUTION INTRAVENOUS PRN
Status: DISCONTINUED | OUTPATIENT
Start: 2022-02-23 | End: 2022-02-24 | Stop reason: HOSPADM

## 2022-02-23 RX ORDER — SODIUM CHLORIDE 0.9 % (FLUSH) 0.9 %
5-40 SYRINGE (ML) INJECTION PRN
Status: DISCONTINUED | OUTPATIENT
Start: 2022-02-23 | End: 2022-02-24 | Stop reason: HOSPADM

## 2022-02-23 RX ORDER — SODIUM CHLORIDE, SODIUM LACTATE, POTASSIUM CHLORIDE, AND CALCIUM CHLORIDE .6; .31; .03; .02 G/100ML; G/100ML; G/100ML; G/100ML
1000 INJECTION, SOLUTION INTRAVENOUS PRN
Status: DISCONTINUED | OUTPATIENT
Start: 2022-02-23 | End: 2022-02-24 | Stop reason: HOSPADM

## 2022-02-23 RX ORDER — FAMOTIDINE 20 MG/1
20 TABLET, FILM COATED ORAL 2 TIMES DAILY
Status: DISCONTINUED | OUTPATIENT
Start: 2022-02-23 | End: 2022-02-24 | Stop reason: HOSPADM

## 2022-02-23 RX ADMIN — FAMOTIDINE 20 MG: 20 TABLET ORAL at 20:36

## 2022-02-23 RX ADMIN — SODIUM CHLORIDE, POTASSIUM CHLORIDE, SODIUM LACTATE AND CALCIUM CHLORIDE: 600; 310; 30; 20 INJECTION, SOLUTION INTRAVENOUS at 21:08

## 2022-02-23 RX ADMIN — DOCUSATE SODIUM 100 MG: 100 CAPSULE ORAL at 15:22

## 2022-02-23 RX ADMIN — SODIUM CHLORIDE, POTASSIUM CHLORIDE, SODIUM LACTATE AND CALCIUM CHLORIDE: 600; 310; 30; 20 INJECTION, SOLUTION INTRAVENOUS at 13:11

## 2022-02-23 RX ADMIN — DOCUSATE SODIUM 100 MG: 100 CAPSULE ORAL at 21:04

## 2022-02-23 RX ADMIN — NIFEDIPINE 10 MG: 10 CAPSULE ORAL at 21:04

## 2022-02-23 RX ADMIN — BETAMETHASONE SODIUM PHOSPHATE AND BETAMETHASONE ACETATE 12 MG: 3; 3 INJECTION, SUSPENSION INTRA-ARTICULAR; INTRALESIONAL; INTRAMUSCULAR at 13:01

## 2022-02-23 RX ADMIN — ASPIRIN 81 MG 81 MG: 81 TABLET ORAL at 20:33

## 2022-02-23 RX ADMIN — ACETAMINOPHEN 650 MG: 325 TABLET ORAL at 19:35

## 2022-02-23 RX ADMIN — SERTRALINE HYDROCHLORIDE 50 MG: 50 TABLET ORAL at 20:33

## 2022-02-23 RX ADMIN — NIFEDIPINE 30 MG: 10 CAPSULE ORAL at 15:17

## 2022-02-23 ASSESSMENT — PAIN SCALES - GENERAL: PAINLEVEL_OUTOF10: 5

## 2022-02-23 ASSESSMENT — ENCOUNTER SYMPTOMS: SHORTNESS OF BREATH: 0

## 2022-02-23 NOTE — LETTER
[facility]  [address] [facility]  Phone: [phone]            February 24, 2022     Patient: Sandrita Ramey   YOB: 1980   Date of Visit: 2/23/2022       To Whom It May Concern: It is my medical opinion that Adrian Bell may return to work on 3/1 as long as OB MD releases work restrictions after appointment on 3/28. .    If you have any questions or concerns, please don't hesitate to call.     Sincerely,

## 2022-02-23 NOTE — FLOWSHEET NOTE
Pt is a 44yo  at 33wks IUP admitted to triage from Dr Inocente Austin office with c/o \"increased brown discharge\" cxs sent from MD office. .Pt verbalized having some cramping yesterday but none today. Noted elevated BP in MD office. BP cycling. Pt denies HA, upper gastric pain, visual problems, cxs, leaking of fluid, active red bleeding or pain. Pt verbalized positive fetal movement. Audible movement noted. Pt is a previous C/S x2. Will call Dr Mojgan Delatorre to see pt.

## 2022-02-23 NOTE — FLOWSHEET NOTE
02/23/22 1330   Maternal Vitals (MEW-T)   Patient Currently in Pain Denies   Fetal Heart Rate   Mode External US   Baseline Rate 125 bpm   Baseline Classification Normal   Variability 6-25 BPM   Pattern Accelerations   Patient Feels Fetal Movement Yes   Interventions Positioned on Right Side   Multiple birth? No   Fetal Monitoring Strip   FMS Reviewed? Yes   FMS Reviewed By? adl   Uterine Activity   UA Mode Palpation; [place]   Contraction Frequency 0   Resting Tone Palpated Soft     NST

## 2022-02-23 NOTE — ANESTHESIA PRE PROCEDURE
Department of Anesthesiology  Preprocedure Note       Name:  Lakeisha Landaverde   Age:  38 y.o.  :  1980                                          MRN:  [id number]         Date:  2022      Surgeon:     Procedure: Possible Repeat C/S    Medications prior to admission:   Prior to Admission medications    Medication Sig Start Date End Date Taking?  Authorizing Provider   aspirin 81 MG chewable tablet Take 81 mg by mouth daily   Yes Historical Provider, MD   famotidine (PEPCID) 20 MG tablet Take 20 mg by mouth 2 times daily   Yes Historical Provider, MD   sertraline (ZOLOFT) 50 MG tablet TAKE ONE TABLET BY MOUTH DAILY 10/30/21  Yes Bela Steele MD   acetaminophen (AMINOFEN) 325 MG tablet Take 2 tablets by mouth every 6 hours as needed for Pain 21  Yes Selina Galvan MD   Prenatal Vit-Fe Fumarate-FA (PRENATAL VITAMIN) 27-0.8 MG TABS Take 1 tablet by mouth daily    Yes Historical Provider, MD   SUMAtriptan (IMITREX) 50 MG tablet TAKE 1 TABLET BY MOUTH AT ONSET OF HEADACHE MAY REPEAT ONE TABLET IN 2 HOURS IF NEEDED MAX OF 2 TABLETS A DAY 21   Bela Steele MD       Current medications:    Current Facility-Administered Medications   Medication Dose Route Frequency Provider Last Rate Last Admin    betamethasone acetate-betamethasone sodium phosphate (CELESTONE) injection 12 mg  12 mg IntraMUSCular Q24H Donna Dobbs MD   12 mg at 22 1301    lactated ringers infusion   IntraVENous Continuous Donna Dobbs  mL/hr at 22 1311 New Bag at 22 1311    lactated ringers bolus  500 mL IntraVENous PRN Donna Dobbs MD        Or    lactated ringers bolus  1,000 mL IntraVENous PRN Donna Dobbs MD        sodium chloride flush 0.9 % injection 5-40 mL  5-40 mL IntraVENous 2 times per day Donna Dobbs MD        sodium chloride flush 0.9 % injection 5-40 mL  5-40 mL IntraVENous PRN Donna Dobbs MD        0.9 % sodium chloride mass index is 32.42 kg/m². CBC:   Lab Results   Component Value Date    WBC 10.2 02/23/2022    RBC 4.02 02/23/2022    HGB 12.1 02/23/2022    HCT 35.6 02/23/2022    MCV 88.6 02/23/2022    RDW 12.8 02/23/2022     02/23/2022       CMP:   Lab Results   Component Value Date     02/23/2022    K 3.8 02/07/2021     02/23/2022    CO2 19 02/23/2022    BUN 12 02/23/2022    CREATININE <0.5 02/23/2022    GFRAA >60 02/23/2022    GFRAA >60 01/22/2013    AGRATIO 1.2 02/23/2022    LABGLOM >60 02/23/2022    GLUCOSE 91 02/23/2022    PROT 6.6 02/23/2022    PROT 7.4 01/22/2013    CALCIUM 8.7 02/23/2022    BILITOT 0.5 02/23/2022    ALKPHOS 114 02/23/2022    AST 16 02/23/2022    ALT 9 02/23/2022       HCG (If Applicable):   Lab Results   Component Value Date    PREGTESTUR Negative 06/14/2018        COVID-19 Screening (If Applicable):   Lab Results   Component Value Date    COVID19 Not Detected 02/07/2021           Anesthesia Evaluation  Patient summary reviewed and Nursing notes reviewed no history of anesthetic complications:   Airway: Mallampati: II  TM distance: >3 FB   Neck ROM: full  Mouth opening: > = 3 FB Dental: normal exam         Pulmonary:Negative Pulmonary ROS and normal exam  breath sounds clear to auscultation      (-) asthma, shortness of breath and recent URI                           Cardiovascular:Negative CV ROS  Exercise tolerance: good (>4 METS),       (-) hypertension and CAD      Rhythm: regular  Rate: normal                    Neuro/Psych:   Negative Neuro/Psych ROS  (+) headaches: migraine headaches, psychiatric history (Anxiety. Takes Zoloft daily.): stable with treatment            GI/Hepatic/Renal: Neg GI/Hepatic/Renal ROS       (-) GERD, liver disease and no renal disease       Endo/Other: Negative Endo/Other ROS       (-) diabetes mellitus               Abdominal:             Vascular: negative vascular ROS. - DVT and PE.       Other Findings:             Anesthesia Plan      spinal ASA 2     (Risks/benefits of spinal anesthesia for C/S discussed, including but not limited to bleeding, infection, nerve damage, and possible conversion to general anesthetic.  )        Anesthetic plan and risks discussed with patient. OB History        5    Para   2    Term   2            AB   1    Living   2       SAB   1    IAB        Ectopic        Molar        Multiple        Live Births   2                Eduardo Love is a 38y.o. year-old female admitted to [facility], for observation. Hx of C/S x2. If goes into active labor will be RLTCS. Gestational age is 33w0d. Her Body mass index is 32.42 kg/m². She was seen, examined and her chart was reviewed (including anesthesia, drug and allergy history). No interval changes are noted to her history and physical examination. (except as noted above).     Risks/benefits/alternatives of both neuraxial and general anesthesia were discussed and she agrees to proceed at the direction of the care team.    PATT Gould CRNA  2022  2:00 PM        PATT Gould CRNA   2022

## 2022-02-23 NOTE — FLOWSHEET NOTE
Noted increased cxs. Dr Waite Likens aware and order received to give 30mg of procardia now. Pt up to BR to void and then medicated per order after medication reviewed with pt. Plan is to give procardia 10mg every 6 hours after this dose. All Amato

## 2022-02-23 NOTE — FLOWSHEET NOTE
Pt admitted to 2285. Pt oriented to room, call light, and plan of care. IV started and labs drawn and sent. B/P cycling.

## 2022-02-23 NOTE — FLOWSHEET NOTE
Dr Cara Lennon here and pt off monitor for ultrasound. Sterile spec exam performed. Noted bright red blood on swabs. SVE performed per MD pt 1cm 80%. MD plans on giving celestone now and repeat in 24 hours. Will admit pt to L/D and keep over night. Pt agrees with plan of care.

## 2022-02-23 NOTE — PLAN OF CARE
VS stable and BP wnl. Pt denies HA, upper gastric pain, visual problems, cxs or pain. Pt aware of need to call with any above or leaking of fluid or increased bleeding. Pt verbalized understanding. Noted irritability that improved with procardia, Will continue to monitor.

## 2022-02-23 NOTE — H&P
Department of Obstetrics and Gynecology   Obstetrics History and Physical        CHIEF COMPLAINT:  Spotting     HISTORY OF PRESENT ILLNESS:    Elma Lee  is a 38 y.o.  female at 33w0d presents with a chief complaint as above and is being admitted for threatened  labor. The patient noticed some brown discharge in her underwear yesterday. Was crampy yesterday evening, but that improved once she was able to have a bowel movement. She then had pink spotting this morning, so called the office and was seen in evaluation. Moderate amount of dark pink discharge was seen on exam.  She denies pain or cramping currently. Good fetal movement. No HA, visual change, or abdominal pain. She does have a h/o GHTN with her last pregnancy and had not had BP issues until her visit today (mild-range in the office). Estimated Due Date: Estimated Date of Delivery: 22    PRENATAL CARE: Complicated by: AMA, h/o GHTN, h/o LTCS x 2, anxiety    PAST OB HISTORY:  OB History        5    Para   2    Term   2            AB   1    Living   2       SAB   1    IAB        Ectopic        Molar        Multiple        Live Births   2              Past Medical History:        Diagnosis Date    Anxiety     Complication of anesthesia     pt states,\" I passed out after my epidural.\"    Heart abnormality     Hypertension     this visit    Mental disorder     Zoloft 50 mg daily    Miscarriage      Past Surgical History:        Procedure Laterality Date    ABDOMEN SURGERY      c/s x2     SECTION  13, 2016    Boy, Girl    COLONOSCOPY      DILATION AND CURETTAGE OF UTERUS N/A 2021    DILATATION AND CURETTAGE SUCTION performed by Chitra Zavala MD at [facility]    WISDOM TOOTH EXTRACTION       Allergies:  Patient has no known allergies.   Social History:    Social History     Socioeconomic History    Marital status:      Spouse name: Not on file    Number of children: Not on file    Years of education: Not on file    Highest education level: Not on file   Occupational History    Occupation:    Tobacco Use    Smoking status: Never Smoker    Smokeless tobacco: Never Used   Vaping Use    Vaping Use: Never used   Substance and Sexual Activity    Alcohol use: Not Currently    Drug use: No    Sexual activity: Yes     Partners: Male   Other Topics Concern    Not on file   Social History Narrative    Not on file     Social Determinants of Health     Financial Resource Strain:     Difficulty of Paying Living Expenses: Not on file   Food Insecurity:     Worried About [address] in the Last Year: Not on file    Ray[place] of Food in the Last Year: Not on file   Transportation Needs:     Lack of Transportation (Medical): Not on file    Lack of Transportation (Non-Medical):  Not on file   Physical Activity:     Days of Exercise per Week: Not on file    Minutes of Exercise per Session: Not on file   Stress:     Feeling of Stress : Not on file   Social Connections:     Frequency of Communication with Friends and Family: Not on file    Frequency of Social Gatherings with Friends and Family: Not on file    Attends Scientology Services: Not on file    Active Member of [address] [place] [address] or Organizations: Not on file    Attends Club or Organization Meetings: Not on file    Marital Status: Not on file   Intimate Partner Violence:     Fear of Current or Ex-Partner: Not on file    Emotionally Abused: Not on file    Physically Abused: Not on file    Sexually Abused: Not on file   Housing Stability:     Unable to Pay for Housing in the Last Year: Not on file    Number of [place] in the Last Year: Not on file    Unstable Housing in the Last Year: Not on file     Family History:       Problem Relation Age of Onset    Arthritis Mother     Immune Disorder Mother     High Cholesterol Father     Depression Sister     Heart Attack Paternal Uncle     Heart Disease Paternal Uncle     High Blood Pressure Paternal Uncle     High Cholesterol Paternal Uncle     Diabetes Maternal Grandmother     Atrial Fibrillation Paternal Grandfather     Heart Attack Paternal Grandfather     Heart Disease Paternal Grandfather     High Cholesterol Paternal Grandfather     High Blood Pressure Paternal Grandfather      Medications Prior to Admission:  Medications Prior to Admission: aspirin 81 MG chewable tablet, Take 81 mg by mouth daily  famotidine (PEPCID) 20 MG tablet, Take 20 mg by mouth 2 times daily  sertraline (ZOLOFT) 50 MG tablet, TAKE ONE TABLET BY MOUTH DAILY  acetaminophen (AMINOFEN) 325 MG tablet, Take 2 tablets by mouth every 6 hours as needed for Pain  Prenatal Vit-Fe Fumarate-FA (PRENATAL VITAMIN) 27-0.8 MG TABS, Take 1 tablet by mouth daily   SUMAtriptan (IMITREX) 50 MG tablet, TAKE 1 TABLET BY MOUTH AT ONSET OF HEADACHE MAY REPEAT ONE TABLET IN 2 HOURS IF NEEDED MAX OF 2 TABLETS A DAY    REVIEW OF SYSTEMS:  negative     PHYSICAL EXAM:    Vitals:    22 1205   BP: (!) 141/84   Pulse: 64   Resp: 18   Temp: 97.8 °F (36.6 °C)   TempSrc: Oral   Weight: 183 lb (83 kg)   Height: 5' 3\" (1.6 m)     General appearance:  awake, alert, cooperative, no apparent distress, and appears stated age  Neurologic:  Awake, alert, oriented to name, place and time.     Lungs:  No increased work of breathing, good air exchange  Abdomen:  Soft, non tender, gravid, fundal height consistent with the gestational age, EFW by Leopold's maneuvers is 4 lb 8 oz  Pelvis:  Adequate pelvis; SSE with small amount of blood, no active bleeding, amniotic membranes visible within the cervix  Cervix: /-3  Contraction frequency: none  Membranes:  Intact  Labs: pending  Pooling, fern negative    US (22): EFW 2206 g (62%), MAHESH 21.4 cm, BPP 8/8      ASSESSMENT:  Pt is a 41y/o [id number] at 33w0d presenting with threatened  labor     PLAN: Admit  Labor: not currently active, but cervix thin and dilated  - would be for RLTCS  - continuous monitoring for now  Fetus: Reassuring, active, vertex on BSUS  - weight 2206 g 2 days ago  - BMTZ now with repeat in 24 hrs  GBS: Unknown    Electronically signed by Rubi Montano MD on 2/23/2022 at 12:53 PM

## 2022-02-24 VITALS
HEIGHT: 63 IN | SYSTOLIC BLOOD PRESSURE: 137 MMHG | WEIGHT: 183 LBS | DIASTOLIC BLOOD PRESSURE: 68 MMHG | TEMPERATURE: 98.2 F | BODY MASS INDEX: 32.43 KG/M2 | HEART RATE: 61 BPM | OXYGEN SATURATION: 98 % | RESPIRATION RATE: 17 BRPM

## 2022-02-24 PROCEDURE — 59025 FETAL NON-STRESS TEST: CPT

## 2022-02-24 PROCEDURE — 6360000002 HC RX W HCPCS: Performed by: OBSTETRICS & GYNECOLOGY

## 2022-02-24 PROCEDURE — 6370000000 HC RX 637 (ALT 250 FOR IP): Performed by: OBSTETRICS & GYNECOLOGY

## 2022-02-24 PROCEDURE — 2580000003 HC RX 258: Performed by: OBSTETRICS & GYNECOLOGY

## 2022-02-24 RX ORDER — METOCLOPRAMIDE HYDROCHLORIDE 5 MG/ML
INJECTION INTRAMUSCULAR; INTRAVENOUS
Status: DISCONTINUED
Start: 2022-02-24 | End: 2022-02-24 | Stop reason: HOSPADM

## 2022-02-24 RX ORDER — TRISODIUM CITRATE DIHYDRATE AND CITRIC ACID MONOHYDRATE 500; 334 MG/5ML; MG/5ML
SOLUTION ORAL
Status: DISCONTINUED
Start: 2022-02-24 | End: 2022-02-24 | Stop reason: HOSPADM

## 2022-02-24 RX ORDER — NIFEDIPINE 10 MG/1
10 CAPSULE ORAL 4 TIMES DAILY
Qty: 28 CAPSULE | Refills: 3 | Status: ON HOLD
Start: 2022-02-24 | End: 2022-03-30 | Stop reason: HOSPADM

## 2022-02-24 RX ADMIN — BETAMETHASONE SODIUM PHOSPHATE AND BETAMETHASONE ACETATE 12 MG: 3; 3 INJECTION, SUSPENSION INTRA-ARTICULAR; INTRALESIONAL; INTRAMUSCULAR at 13:25

## 2022-02-24 RX ADMIN — SODIUM CHLORIDE, POTASSIUM CHLORIDE, SODIUM LACTATE AND CALCIUM CHLORIDE: 600; 310; 30; 20 INJECTION, SOLUTION INTRAVENOUS at 05:19

## 2022-02-24 RX ADMIN — NIFEDIPINE 10 MG: 10 CAPSULE ORAL at 03:31

## 2022-02-24 RX ADMIN — NIFEDIPINE 10 MG: 10 CAPSULE ORAL at 14:03

## 2022-02-24 RX ADMIN — NIFEDIPINE 10 MG: 10 CAPSULE ORAL at 08:57

## 2022-02-24 RX ADMIN — FAMOTIDINE 20 MG: 20 TABLET ORAL at 08:58

## 2022-02-24 RX ADMIN — ACETAMINOPHEN 650 MG: 325 TABLET ORAL at 08:03

## 2022-02-24 RX ADMIN — ACETAMINOPHEN 650 MG: 325 TABLET ORAL at 01:00

## 2022-02-24 RX ADMIN — PRENATAL VIT W/ FE FUMARATE-FA TAB 27-0.8 MG 1 TABLET: 27-0.8 TAB at 08:57

## 2022-02-24 RX ADMIN — DOCUSATE SODIUM 100 MG: 100 CAPSULE ORAL at 08:57

## 2022-02-24 ASSESSMENT — PAIN DESCRIPTION - ONSET: ONSET: ON-GOING

## 2022-02-24 ASSESSMENT — PAIN DESCRIPTION - FREQUENCY: FREQUENCY: CONTINUOUS

## 2022-02-24 ASSESSMENT — PAIN DESCRIPTION - DESCRIPTORS
DESCRIPTORS: HEADACHE
DESCRIPTORS: HEADACHE

## 2022-02-24 ASSESSMENT — PAIN DESCRIPTION - LOCATION: LOCATION: HEAD

## 2022-02-24 ASSESSMENT — PAIN SCALES - GENERAL: PAINLEVEL_OUTOF10: 3

## 2022-02-24 ASSESSMENT — PAIN DESCRIPTION - PROGRESSION: CLINICAL_PROGRESSION: GRADUALLY IMPROVING

## 2022-02-24 ASSESSMENT — PAIN DESCRIPTION - PAIN TYPE: TYPE: ACUTE PAIN

## 2022-02-24 ASSESSMENT — PAIN DESCRIPTION - ORIENTATION: ORIENTATION: ANTERIOR

## 2022-02-24 ASSESSMENT — PAIN - FUNCTIONAL ASSESSMENT: PAIN_FUNCTIONAL_ASSESSMENT: ACTIVITIES ARE NOT PREVENTED

## 2022-02-24 NOTE — FLOWSHEET NOTE
Pt reports headache is back and wants to try some chicken broth and jello. Chicken broth and jello provided. Pt sitting up in bed to eat.

## 2022-02-24 NOTE — PROGRESS NOTES
Department of Obstetrics and Gynecology  Antepartum rounds     SUBJECTIVE:    Patient is Jordan Bowden  is a 38 y.o. [id number] female at 33w1d admitted for vaginal bleeding and possible  labor. She denies vaginal bleeding, leakage of fluid, or contractions. She states the baby is moving well. She denies headache, fever, nausea/vomiting, shortness of breath, palpitations, abdominal pain on ROS. OBJECTIVE:  Vital Signs: /68   Pulse 61   Temp 98.2 °F (36.8 °C) (Oral)   Resp 18   Ht 5' 3\" (1.6 m)   Wt 183 lb (83 kg)   SpO2 98%   BMI 32.42 kg/m²   Intake / Output: I/O last 3 completed shifts: In: 273.6 [I.V.:273.6]  Out: 5864 [Urine:1550]  I/O this shift:  In: 120 [P.O.:120]  Out: 1200 [Urine:1200]  Appearance/Psychiatric: awake, alert, cooperative, no apparent distress, appears stated age  Constitutional: The patient is well nourished. Cardiovascular: She does not have edema. Respiratory: Respiratory effort is normal.  Gastrointestinal: Soft, non tender, gravid, fundal heights is consistent with gestational age  Cervix: exam not performed    LABS / IMAGING:  CBC:   Lab Results   Component Value Date    WBC 10.2 2022    RBC 4.02 2022    HGB 12.1 2022    HCT 35.6 2022    MCV 88.6 2022    MCH 30.2 2022    MCHC 34.1 2022    RDW 12.8 2022     2022    MPV 7.9 2022     Hepatic Function Panel:    Lab Results   Component Value Date    ALKPHOS 114 2022    ALT 9 2022    AST 16 2022    PROT 6.6 2022    PROT 7.4 2013    BILITOT 0.5 2022    LABALBU 3.6 2022       ASSESSMENT AND PLAN:   Threatened  labor & vaginal bleeding   Improved after IV fluids & Procardia, will send home with Procardia until f/u appt on Monday   BMZ #2 due now  H/o CS x2   Plans repeat  H/o GHTN   Labs WNL on admission    Discharge home this afternoon. F/u appt for NST, US and OB visit scheduled on .  Off work until appt, to discuss further plans with Dr. Yenifer Miller at that time. Discussed complete pelvic rest, no intercourse. Patient to call with any increase in ctxs/cramping, bleeding or LOF.        Electronically signed by Yogesh Burr MD on 2/24/2022 at 1:03 PM

## 2022-02-24 NOTE — FLOWSHEET NOTE
Second dose of Betamethasone given in left glute. IV discontinued due to patient being discharged today.

## 2022-02-24 NOTE — FLOWSHEET NOTE
teaching completed and forms signed by patient. Copy witnessed by RN and given to patient. Patient verbalized understanding of all teaching points. Prescriptions sent to pharmacy listed and patietn aware. I gave her scheduled procardia prior to discharge. Patient plans to follow-up with [place] Provider as instructed. Patient verbalizes understanding of discharge instructions and denies further questions. Patient discharged in stable condition accompanied by family.

## 2022-02-24 NOTE — FLOWSHEET NOTE
Dr. Tamela Lim notified pt complains of headache. Order received for Tylenol and if still unresolved after tylenol for D5 in LR.

## 2022-02-24 NOTE — FLOWSHEET NOTE
VS stable. Pt denies upper gastric pain, visual problems, cxs, bleeding, leaking of fluid, or additional dark discharge since last night. Note irritability. Pt up to BR then to side. Pt medicated with tylenol po for HA pain rated a 5/10. Pt instructed to call if HA gets worse or any sym,ptoms above. Pt verbalized understanding.

## 2022-02-24 NOTE — DISCHARGE SUMMARY
Department of Obstetrics and Gynecology  Antepartum Discharge Summary    Admit Date: 2022    Admit Diagnosis: Threatened  labor, third trimester [O47.03]    Discharge Date: 22     Discharge Diagnoses: Threatened  labor       Medication List      START taking these medications    NIFEdipine 10 MG capsule  Commonly known as: PROCARDIA  Take 1 capsule by mouth 4 times daily        CONTINUE taking these medications    acetaminophen 325 MG tablet  Commonly known as: Aminofen  Take 2 tablets by mouth every 6 hours as needed for Pain     aspirin 81 MG chewable tablet     famotidine 20 MG tablet  Commonly known as: PEPCID     prenatal vitamin 27-0.8 MG Tabs     sertraline 50 MG tablet  Commonly known as: ZOLOFT  TAKE ONE TABLET BY MOUTH DAILY     SUMAtriptan 50 MG tablet  Commonly known as: IMITREX  TAKE 1 TABLET BY MOUTH AT ONSET OF HEADACHE MAY REPEAT ONE TABLET IN 2 HOURS IF NEEDED MAX OF 2 TABLETS A DAY        STOP taking these medications    ibuprofen 200 MG tablet  Commonly known as: Advil           Where to Get Your Medications      [facility] [address] [facility]    Phone: [phone]   · NIFEdipine 10 MG capsule         Service: Obstetrics    Consults: none    Significant Diagnostic Studies: none    Treatments: Steroids and Uterotonics    Condition at Discharge: good    Hospital Course: Patient is a 42y.o. [id number] at 33wks who presented for evaluation after noticing some cramping and spotting. On admission, she was noted to have some uterine irritability and found to be 1/80/-3 with membranes visualized on exam. She was admitted for overnight observation and betamethasone course. She was also started on Procardia. Spotting resolved during her admission and patient denied cramping or ctxs upon discharge after the completion of her steroid course. Discharge Instructions: Activity: Complete pelvic rest, light activity at home, no heavy lifting.  Off work until Tuesday (3/1), to discuss further work plans with Dr. Ion Pitts at [facility] on Monday (2/28)    Diet: regular diet    Discharge to: Home    Disposition / Followup:  4 days    Electronically signed by Yomaira Kulkarni MD on 2/24/2022 at 1:16 PM

## 2022-02-24 NOTE — FLOWSHEET NOTE
Procardia given per order. Pt denies any pain or contractions. Pt resting. Pt reports at last time up to the bathroom there was smaller than a dime size of brown blood on beverley pad. Will continue to monitor.

## 2022-02-24 NOTE — FLOWSHEET NOTE
Pt denies any contractions, bleeding, or discomfort. Pt aware to notify RN of any changes. Abdomen soft. Pt reports fetal movement. Pt denies any further needs will continue to monitor.

## 2022-02-24 NOTE — PLAN OF CARE
Problem: Healthcare acquired conditions:  Goal: Absence of healthcare acquired conditions  Description: Absence of healthcare acquired conditions  Outcome: Ongoing     Problem: Coping:  Goal: Level of anxiety will decrease  Description: Level of anxiety will decrease  Outcome: Ongoing  Goal: Ability to identify and develop effective coping behavior will improve  Description: Ability to identify and develop effective coping behavior will improve  Outcome: Ongoing  Goal: Participation in decision-making will improve  Description: Participation in decision-making will improve  Outcome: Ongoing     Problem: Fluid Volume:  Goal: Will maintain adequate fluid volume  Description: Will maintain adequate fluid volume  Outcome: Ongoing     Problem: Life Cycle:  Goal: Risk for  labor will decrease  Description: Risk for  labor will decrease  Outcome: Ongoing     Problem: Physical Regulation:  Goal: Complications related to the disease process, condition or treatment will be avoided or minimized  Description: Complications related to the disease process, condition or treatment will be avoided or minimized  Outcome: Ongoing     Problem: Sensory:  Goal: Relief or control of pain from uterine contractions will improve  Description: Relief or control of pain from uterine contractions will improve  Outcome: Ongoing     Problem: Pain:  Description: Pain management should include both nonpharmacologic and pharmacologic interventions.   Goal: Pain level will decrease  Description: Pain level will decrease  Outcome: Ongoing  Goal: Control of acute pain  Description: Control of acute pain  Outcome: Ongoing  Goal: Control of chronic pain  Description: Control of chronic pain  Outcome: Ongoing

## 2022-02-24 NOTE — FLOWSHEET NOTE
02/24/22 0030   Maternal Vitals (MEW-T)   Temp 98.2 °F (36.8 °C)   Temp Source Oral   Pulse 78   Heart Rate Source Monitor   Resp 18   /67   BP Method Automatic   Patient Position Semi fowlers   Level of Consciousness Alert   Pain Management   Pain Level 6   Pain Assessment   Pain Assessment 0-10   Patient's Stated Pain Goal 4   Pain Type Acute pain   Pain Location Head   Pain Descriptors Headache   Pain Frequency Intermittent   Pain Onset On-going   Clinical Progression Gradually worsening   Functional Pain Assessment Activities are not prevented   Non-Pharmaceutical Pain Intervention(s) Rest   Fetal Heart Rate   Mode External US   Baseline Rate 135 bpm   Baseline Classification Normal   Variability 6-25 BPM   Pattern Accelerations   Patient Feels Fetal Movement Yes   Interventions RN at Bedside   OB Bladder Status Non-distended   Fetal Monitoring Strip   FMS Reviewed?  Yes   FMS Reviewed By? cs   Uterine Activity   UA Mode [place];Palpation   Contraction Frequency none   Resting Tone Palpated Soft No difficulties

## 2022-03-21 LAB — GBS, EXTERNAL RESULT: NEGATIVE

## 2022-03-28 ENCOUNTER — HOSPITAL ENCOUNTER (INPATIENT)
Age: 42
LOS: 2 days | Discharge: HOME | End: 2022-03-30
Attending: OBSTETRICS & GYNECOLOGY | Admitting: OBSTETRICS & GYNECOLOGY
Payer: COMMERCIAL

## 2022-03-28 ENCOUNTER — ANESTHESIA EVENT (OUTPATIENT)
Dept: LABOR AND DELIVERY | Age: 42
End: 2022-03-28
Payer: COMMERCIAL

## 2022-03-28 ENCOUNTER — ANESTHESIA (OUTPATIENT)
Dept: LABOR AND DELIVERY | Age: 42
End: 2022-03-28
Payer: COMMERCIAL

## 2022-03-28 VITALS
SYSTOLIC BLOOD PRESSURE: 119 MMHG | DIASTOLIC BLOOD PRESSURE: 61 MMHG | RESPIRATION RATE: 1 BRPM | OXYGEN SATURATION: 100 %

## 2022-03-28 DIAGNOSIS — Z98.891 HISTORY OF LOW TRANSVERSE CESAREAN SECTION: Primary | ICD-10-CM

## 2022-03-28 PROBLEM — O13.3 GESTATIONAL HYPERTENSION, THIRD TRIMESTER: Status: ACTIVE | Noted: 2022-03-28

## 2022-03-28 LAB
A/G RATIO: 1.3 (ref 1.1–2.2)
ABO/RH: NORMAL
ALBUMIN SERPL-MCNC: 3.9 G/DL (ref 3.4–5)
ALP BLD-CCNC: 170 U/L (ref 40–129)
ALT SERPL-CCNC: 9 U/L (ref 10–40)
AMPHETAMINE SCREEN, URINE: NORMAL
ANION GAP SERPL CALCULATED.3IONS-SCNC: 17 MMOL/L (ref 3–16)
ANTIBODY SCREEN: NORMAL
AST SERPL-CCNC: 15 U/L (ref 15–37)
BARBITURATE SCREEN URINE: NORMAL
BENZODIAZEPINE SCREEN, URINE: NORMAL
BILIRUB SERPL-MCNC: 0.7 MG/DL (ref 0–1)
BUN BLDV-MCNC: 10 MG/DL (ref 7–20)
CALCIUM SERPL-MCNC: 9.8 MG/DL (ref 8.3–10.6)
CANNABINOID SCREEN URINE: NORMAL
CHLORIDE BLD-SCNC: 99 MMOL/L (ref 99–110)
CO2: 17 MMOL/L (ref 21–32)
COCAINE METABOLITE SCREEN URINE: NORMAL
CREAT SERPL-MCNC: 0.5 MG/DL (ref 0.6–1.1)
CREATININE URINE: 43.2 MG/DL (ref 28–259)
GFR AFRICAN AMERICAN: >60
GFR NON-AFRICAN AMERICAN: >60
GLUCOSE BLD-MCNC: 80 MG/DL (ref 70–99)
HCT VFR BLD CALC: 38.5 % (ref 36–48)
HEMOGLOBIN: 13.2 G/DL (ref 12–16)
LACTATE DEHYDROGENASE: 171 U/L (ref 100–190)
Lab: NORMAL
MCH RBC QN AUTO: 30.2 PG (ref 26–34)
MCHC RBC AUTO-ENTMCNC: 34.3 G/DL (ref 31–36)
MCV RBC AUTO: 88 FL (ref 80–100)
METHADONE SCREEN, URINE: NORMAL
OPIATE SCREEN URINE: NORMAL
OXYCODONE URINE: NORMAL
PDW BLD-RTO: 13.4 % (ref 12.4–15.4)
PH UA: 7
PHENCYCLIDINE SCREEN URINE: NORMAL
PLATELET # BLD: 245 K/UL (ref 135–450)
PMV BLD AUTO: 9 FL (ref 5–10.5)
POTASSIUM SERPL-SCNC: 3.8 MMOL/L (ref 3.5–5.1)
PROPOXYPHENE SCREEN: NORMAL
PROTEIN PROTEIN: 11 MG/DL
PROTEIN/CREAT RATIO: 0.3 MG/DL
RBC # BLD: 4.37 M/UL (ref 4–5.2)
SODIUM BLD-SCNC: 133 MMOL/L (ref 136–145)
TOTAL PROTEIN: 6.8 G/DL (ref 6.4–8.2)
URIC ACID, SERUM: 5.2 MG/DL (ref 2.6–6)
WBC # BLD: 9.2 K/UL (ref 4–11)

## 2022-03-28 PROCEDURE — 2500000003 HC RX 250 WO HCPCS: Performed by: NURSE ANESTHETIST, CERTIFIED REGISTERED

## 2022-03-28 PROCEDURE — 6360000002 HC RX W HCPCS: Performed by: NURSE ANESTHETIST, CERTIFIED REGISTERED

## 2022-03-28 PROCEDURE — 80307 DRUG TEST PRSMV CHEM ANLYZR: CPT

## 2022-03-28 PROCEDURE — 2580000003 HC RX 258: Performed by: NURSE ANESTHETIST, CERTIFIED REGISTERED

## 2022-03-28 PROCEDURE — 84550 ASSAY OF BLOOD/URIC ACID: CPT

## 2022-03-28 PROCEDURE — 6370000000 HC RX 637 (ALT 250 FOR IP): Performed by: ANESTHESIOLOGY

## 2022-03-28 PROCEDURE — 2709999900 HC NON-CHARGEABLE SUPPLY: Performed by: OBSTETRICS & GYNECOLOGY

## 2022-03-28 PROCEDURE — 3700000001 HC ADD 15 MINUTES (ANESTHESIA): Performed by: OBSTETRICS & GYNECOLOGY

## 2022-03-28 PROCEDURE — 83615 LACTATE (LD) (LDH) ENZYME: CPT

## 2022-03-28 PROCEDURE — 6360000002 HC RX W HCPCS: Performed by: OBSTETRICS & GYNECOLOGY

## 2022-03-28 PROCEDURE — 1220000000 HC SEMI PRIVATE OB R&B

## 2022-03-28 PROCEDURE — 3609079900 HC CESAREAN SECTION: Performed by: OBSTETRICS & GYNECOLOGY

## 2022-03-28 PROCEDURE — 6360000002 HC RX W HCPCS: Performed by: ANESTHESIOLOGY

## 2022-03-28 PROCEDURE — 80053 COMPREHEN METABOLIC PANEL: CPT

## 2022-03-28 PROCEDURE — 2580000003 HC RX 258: Performed by: OBSTETRICS & GYNECOLOGY

## 2022-03-28 PROCEDURE — 7100000000 HC PACU RECOVERY - FIRST 15 MIN: Performed by: OBSTETRICS & GYNECOLOGY

## 2022-03-28 PROCEDURE — 84156 ASSAY OF PROTEIN URINE: CPT

## 2022-03-28 PROCEDURE — 6370000000 HC RX 637 (ALT 250 FOR IP): Performed by: OBSTETRICS & GYNECOLOGY

## 2022-03-28 PROCEDURE — 59025 FETAL NON-STRESS TEST: CPT

## 2022-03-28 PROCEDURE — 96374 THER/PROPH/DIAG INJ IV PUSH: CPT

## 2022-03-28 PROCEDURE — 85027 COMPLETE CBC AUTOMATED: CPT

## 2022-03-28 PROCEDURE — 3700000000 HC ANESTHESIA ATTENDED CARE: Performed by: OBSTETRICS & GYNECOLOGY

## 2022-03-28 PROCEDURE — 86900 BLOOD TYPING SEROLOGIC ABO: CPT

## 2022-03-28 PROCEDURE — 7100000001 HC PACU RECOVERY - ADDTL 15 MIN: Performed by: OBSTETRICS & GYNECOLOGY

## 2022-03-28 PROCEDURE — 86901 BLOOD TYPING SEROLOGIC RH(D): CPT

## 2022-03-28 PROCEDURE — 2720000010 HC SURG SUPPLY STERILE: Performed by: OBSTETRICS & GYNECOLOGY

## 2022-03-28 PROCEDURE — 82570 ASSAY OF URINE CREATININE: CPT

## 2022-03-28 PROCEDURE — A4216 STERILE WATER/SALINE, 10 ML: HCPCS | Performed by: OBSTETRICS & GYNECOLOGY

## 2022-03-28 PROCEDURE — 86850 RBC ANTIBODY SCREEN: CPT

## 2022-03-28 PROCEDURE — 2500000003 HC RX 250 WO HCPCS: Performed by: OBSTETRICS & GYNECOLOGY

## 2022-03-28 RX ORDER — NALOXONE HYDROCHLORIDE 0.4 MG/ML
0.4 INJECTION, SOLUTION INTRAMUSCULAR; INTRAVENOUS; SUBCUTANEOUS PRN
Status: DISCONTINUED | OUTPATIENT
Start: 2022-03-28 | End: 2022-03-28

## 2022-03-28 RX ORDER — ONDANSETRON 2 MG/ML
4 INJECTION INTRAMUSCULAR; INTRAVENOUS EVERY 6 HOURS PRN
Status: DISCONTINUED | OUTPATIENT
Start: 2022-03-28 | End: 2022-03-30 | Stop reason: HOSPADM

## 2022-03-28 RX ORDER — SODIUM CHLORIDE, SODIUM LACTATE, POTASSIUM CHLORIDE, CALCIUM CHLORIDE 600; 310; 30; 20 MG/100ML; MG/100ML; MG/100ML; MG/100ML
INJECTION, SOLUTION INTRAVENOUS CONTINUOUS PRN
Status: DISCONTINUED | OUTPATIENT
Start: 2022-03-28 | End: 2022-03-28 | Stop reason: SDUPTHER

## 2022-03-28 RX ORDER — KETOROLAC TROMETHAMINE 30 MG/ML
30 INJECTION, SOLUTION INTRAMUSCULAR; INTRAVENOUS EVERY 6 HOURS
Status: DISCONTINUED | OUTPATIENT
Start: 2022-03-28 | End: 2022-03-28

## 2022-03-28 RX ORDER — ONDANSETRON 2 MG/ML
4 INJECTION INTRAMUSCULAR; INTRAVENOUS EVERY 6 HOURS PRN
Status: DISCONTINUED | OUTPATIENT
Start: 2022-03-28 | End: 2022-03-28

## 2022-03-28 RX ORDER — OXYTOCIN 10 [USP'U]/ML
INJECTION, SOLUTION INTRAMUSCULAR; INTRAVENOUS PRN
Status: DISCONTINUED | OUTPATIENT
Start: 2022-03-28 | End: 2022-03-28 | Stop reason: SDUPTHER

## 2022-03-28 RX ORDER — FENTANYL CITRATE 50 UG/ML
INJECTION, SOLUTION INTRAMUSCULAR; INTRAVENOUS PRN
Status: DISCONTINUED | OUTPATIENT
Start: 2022-03-28 | End: 2022-03-28 | Stop reason: SDUPTHER

## 2022-03-28 RX ORDER — SODIUM CHLORIDE 9 MG/ML
INJECTION, SOLUTION INTRAVENOUS PRN
Status: DISCONTINUED | OUTPATIENT
Start: 2022-03-28 | End: 2022-03-28

## 2022-03-28 RX ORDER — MIDAZOLAM HYDROCHLORIDE 1 MG/ML
INJECTION INTRAMUSCULAR; INTRAVENOUS PRN
Status: DISCONTINUED | OUTPATIENT
Start: 2022-03-28 | End: 2022-03-28 | Stop reason: SDUPTHER

## 2022-03-28 RX ORDER — SIMETHICONE 80 MG
80 TABLET,CHEWABLE ORAL EVERY 6 HOURS PRN
Status: DISCONTINUED | OUTPATIENT
Start: 2022-03-28 | End: 2022-03-30 | Stop reason: HOSPADM

## 2022-03-28 RX ORDER — OXYCODONE HYDROCHLORIDE AND ACETAMINOPHEN 5; 325 MG/1; MG/1
2 TABLET ORAL EVERY 4 HOURS PRN
Status: DISCONTINUED | OUTPATIENT
Start: 2022-03-28 | End: 2022-03-30 | Stop reason: HOSPADM

## 2022-03-28 RX ORDER — KETOROLAC TROMETHAMINE 30 MG/ML
30 INJECTION, SOLUTION INTRAMUSCULAR; INTRAVENOUS EVERY 6 HOURS
Status: DISPENSED | OUTPATIENT
Start: 2022-03-28 | End: 2022-03-29

## 2022-03-28 RX ORDER — NICOTINE 21 MG/24HR
1 PATCH, TRANSDERMAL 24 HOURS TRANSDERMAL DAILY
Status: DISCONTINUED | OUTPATIENT
Start: 2022-03-28 | End: 2022-03-30 | Stop reason: HOSPADM

## 2022-03-28 RX ORDER — SODIUM CHLORIDE 0.9 % (FLUSH) 0.9 %
10 SYRINGE (ML) INJECTION EVERY 12 HOURS SCHEDULED
Status: DISCONTINUED | OUTPATIENT
Start: 2022-03-28 | End: 2022-03-28

## 2022-03-28 RX ORDER — NALBUPHINE HCL 10 MG/ML
5 AMPUL (ML) INJECTION EVERY 4 HOURS PRN
Status: DISCONTINUED | OUTPATIENT
Start: 2022-03-28 | End: 2022-03-30 | Stop reason: HOSPADM

## 2022-03-28 RX ORDER — TRISODIUM CITRATE DIHYDRATE AND CITRIC ACID MONOHYDRATE 500; 334 MG/5ML; MG/5ML
30 SOLUTION ORAL ONCE
Status: COMPLETED | OUTPATIENT
Start: 2022-03-28 | End: 2022-03-28

## 2022-03-28 RX ORDER — SODIUM CHLORIDE, SODIUM LACTATE, POTASSIUM CHLORIDE, AND CALCIUM CHLORIDE .6; .31; .03; .02 G/100ML; G/100ML; G/100ML; G/100ML
1000 INJECTION, SOLUTION INTRAVENOUS ONCE
Status: DISCONTINUED | OUTPATIENT
Start: 2022-03-28 | End: 2022-03-28

## 2022-03-28 RX ORDER — SODIUM CHLORIDE 0.9 % (FLUSH) 0.9 %
5-40 SYRINGE (ML) INJECTION PRN
Status: DISCONTINUED | OUTPATIENT
Start: 2022-03-28 | End: 2022-03-30 | Stop reason: HOSPADM

## 2022-03-28 RX ORDER — IBUPROFEN 800 MG/1
800 TABLET ORAL EVERY 8 HOURS PRN
Status: DISCONTINUED | OUTPATIENT
Start: 2022-03-29 | End: 2022-03-30 | Stop reason: HOSPADM

## 2022-03-28 RX ORDER — OXYCODONE HYDROCHLORIDE AND ACETAMINOPHEN 5; 325 MG/1; MG/1
1 TABLET ORAL EVERY 4 HOURS PRN
Status: DISCONTINUED | OUTPATIENT
Start: 2022-03-28 | End: 2022-03-30 | Stop reason: HOSPADM

## 2022-03-28 RX ORDER — EPHEDRINE SULFATE 50 MG/ML
INJECTION INTRAVENOUS PRN
Status: DISCONTINUED | OUTPATIENT
Start: 2022-03-28 | End: 2022-03-28 | Stop reason: SDUPTHER

## 2022-03-28 RX ORDER — ACETAMINOPHEN 325 MG/1
975 TABLET ORAL
Status: COMPLETED | OUTPATIENT
Start: 2022-03-28 | End: 2022-03-28

## 2022-03-28 RX ORDER — PRENATAL VIT/IRON FUM/FOLIC AC 27MG-0.8MG
1 TABLET ORAL DAILY
Status: DISCONTINUED | OUTPATIENT
Start: 2022-03-28 | End: 2022-03-30 | Stop reason: HOSPADM

## 2022-03-28 RX ORDER — SODIUM CHLORIDE, SODIUM LACTATE, POTASSIUM CHLORIDE, CALCIUM CHLORIDE 600; 310; 30; 20 MG/100ML; MG/100ML; MG/100ML; MG/100ML
INJECTION, SOLUTION INTRAVENOUS CONTINUOUS
Status: DISCONTINUED | OUTPATIENT
Start: 2022-03-28 | End: 2022-03-28

## 2022-03-28 RX ORDER — ONDANSETRON 2 MG/ML
INJECTION INTRAMUSCULAR; INTRAVENOUS PRN
Status: DISCONTINUED | OUTPATIENT
Start: 2022-03-28 | End: 2022-03-28 | Stop reason: SDUPTHER

## 2022-03-28 RX ORDER — SODIUM CHLORIDE 0.9 % (FLUSH) 0.9 %
5-40 SYRINGE (ML) INJECTION EVERY 12 HOURS SCHEDULED
Status: DISCONTINUED | OUTPATIENT
Start: 2022-03-28 | End: 2022-03-30 | Stop reason: HOSPADM

## 2022-03-28 RX ORDER — SODIUM CHLORIDE 9 MG/ML
25 INJECTION, SOLUTION INTRAVENOUS PRN
Status: DISCONTINUED | OUTPATIENT
Start: 2022-03-28 | End: 2022-03-30 | Stop reason: HOSPADM

## 2022-03-28 RX ORDER — DOCUSATE SODIUM 100 MG/1
100 CAPSULE, LIQUID FILLED ORAL 2 TIMES DAILY
Status: DISCONTINUED | OUTPATIENT
Start: 2022-03-28 | End: 2022-03-30 | Stop reason: HOSPADM

## 2022-03-28 RX ORDER — NALBUPHINE HCL 10 MG/ML
5 AMPUL (ML) INJECTION EVERY 4 HOURS PRN
Status: DISCONTINUED | OUTPATIENT
Start: 2022-03-28 | End: 2022-03-28

## 2022-03-28 RX ORDER — BUPIVACAINE HYDROCHLORIDE 7.5 MG/ML
INJECTION, SOLUTION INTRASPINAL PRN
Status: DISCONTINUED | OUTPATIENT
Start: 2022-03-28 | End: 2022-03-28 | Stop reason: SDUPTHER

## 2022-03-28 RX ORDER — KETOROLAC TROMETHAMINE 30 MG/ML
INJECTION, SOLUTION INTRAMUSCULAR; INTRAVENOUS PRN
Status: DISCONTINUED | OUTPATIENT
Start: 2022-03-28 | End: 2022-03-28 | Stop reason: SDUPTHER

## 2022-03-28 RX ORDER — DEXAMETHASONE SODIUM PHOSPHATE 4 MG/ML
INJECTION, SOLUTION INTRA-ARTICULAR; INTRALESIONAL; INTRAMUSCULAR; INTRAVENOUS; SOFT TISSUE PRN
Status: DISCONTINUED | OUTPATIENT
Start: 2022-03-28 | End: 2022-03-28 | Stop reason: SDUPTHER

## 2022-03-28 RX ORDER — MORPHINE SULFATE 1 MG/ML
INJECTION, SOLUTION EPIDURAL; INTRATHECAL; INTRAVENOUS PRN
Status: DISCONTINUED | OUTPATIENT
Start: 2022-03-28 | End: 2022-03-28 | Stop reason: SDUPTHER

## 2022-03-28 RX ORDER — EPINEPHRINE 1 MG/ML
INJECTION, SOLUTION, CONCENTRATE INTRAVENOUS PRN
Status: DISCONTINUED | OUTPATIENT
Start: 2022-03-28 | End: 2022-03-28 | Stop reason: SDUPTHER

## 2022-03-28 RX ORDER — LANOLIN 100 %
OINTMENT (GRAM) TOPICAL
Status: DISCONTINUED | OUTPATIENT
Start: 2022-03-28 | End: 2022-03-30 | Stop reason: HOSPADM

## 2022-03-28 RX ORDER — SODIUM CHLORIDE 0.9 % (FLUSH) 0.9 %
10 SYRINGE (ML) INJECTION PRN
Status: DISCONTINUED | OUTPATIENT
Start: 2022-03-28 | End: 2022-03-28

## 2022-03-28 RX ORDER — METHYLERGONOVINE MALEATE 0.2 MG/ML
200 INJECTION INTRAVENOUS PRN
Status: DISCONTINUED | OUTPATIENT
Start: 2022-03-28 | End: 2022-03-30 | Stop reason: HOSPADM

## 2022-03-28 RX ADMIN — EPHEDRINE SULFATE 20 MG: 50 INJECTION INTRAVENOUS at 16:22

## 2022-03-28 RX ADMIN — SODIUM CHLORIDE, POTASSIUM CHLORIDE, SODIUM LACTATE AND CALCIUM CHLORIDE: 600; 310; 30; 20 INJECTION, SOLUTION INTRAVENOUS at 17:40

## 2022-03-28 RX ADMIN — SODIUM CHLORIDE, PRESERVATIVE FREE 10 ML: 5 INJECTION INTRAVENOUS at 14:58

## 2022-03-28 RX ADMIN — DEXAMETHASONE SODIUM PHOSPHATE 8 MG: 4 INJECTION, SOLUTION INTRAMUSCULAR; INTRAVENOUS at 16:33

## 2022-03-28 RX ADMIN — KETOROLAC TROMETHAMINE 30 MG: 30 INJECTION, SOLUTION INTRAMUSCULAR at 17:04

## 2022-03-28 RX ADMIN — CEFAZOLIN 2000 MG: 10 INJECTION, POWDER, FOR SOLUTION INTRAVENOUS at 16:07

## 2022-03-28 RX ADMIN — EPHEDRINE SULFATE 20 MG: 50 INJECTION INTRAVENOUS at 16:16

## 2022-03-28 RX ADMIN — MIDAZOLAM 0.5 MG: 1 INJECTION INTRAMUSCULAR; INTRAVENOUS at 16:00

## 2022-03-28 RX ADMIN — NALBUPHINE HYDROCHLORIDE 5 MG: 10 INJECTION, SOLUTION INTRAMUSCULAR; INTRAVENOUS; SUBCUTANEOUS at 18:27

## 2022-03-28 RX ADMIN — ACETAMINOPHEN 975 MG: 325 TABLET ORAL at 14:56

## 2022-03-28 RX ADMIN — FENTANYL CITRATE 15 MCG: 50 INJECTION INTRAMUSCULAR; INTRAVENOUS at 16:06

## 2022-03-28 RX ADMIN — EPHEDRINE SULFATE 10 MG: 50 INJECTION INTRAVENOUS at 16:19

## 2022-03-28 RX ADMIN — SODIUM CHLORIDE, SODIUM LACTATE, POTASSIUM CHLORIDE, AND CALCIUM CHLORIDE: .6; .31; .03; .02 INJECTION, SOLUTION INTRAVENOUS at 16:27

## 2022-03-28 RX ADMIN — KETOROLAC TROMETHAMINE 30 MG: 30 INJECTION, SOLUTION INTRAMUSCULAR at 23:31

## 2022-03-28 RX ADMIN — Medication 87.3 MILLI-UNITS/MIN: at 17:34

## 2022-03-28 RX ADMIN — FAMOTIDINE 20 MG: 10 INJECTION, SOLUTION INTRAVENOUS at 14:55

## 2022-03-28 RX ADMIN — SODIUM CHLORIDE, SODIUM LACTATE, POTASSIUM CHLORIDE, AND CALCIUM CHLORIDE 1000 ML: .6; .31; .03; .02 INJECTION, SOLUTION INTRAVENOUS at 15:00

## 2022-03-28 RX ADMIN — NALBUPHINE HYDROCHLORIDE 5 MG: 10 INJECTION, SOLUTION INTRAMUSCULAR; INTRAVENOUS; SUBCUTANEOUS at 23:31

## 2022-03-28 RX ADMIN — MORPHINE SULFATE 0.2 MG: 1 INJECTION, SOLUTION EPIDURAL; INTRATHECAL; INTRAVENOUS at 16:06

## 2022-03-28 RX ADMIN — ONDANSETRON 4 MG: 2 INJECTION INTRAMUSCULAR; INTRAVENOUS at 16:33

## 2022-03-28 RX ADMIN — OXYTOCIN 20 UNITS: 10 INJECTION, SOLUTION INTRAMUSCULAR; INTRAVENOUS at 16:27

## 2022-03-28 RX ADMIN — BUPIVACAINE HYDROCHLORIDE IN DEXTROSE 1.6 ML: 7.5 INJECTION, SOLUTION SUBARACHNOID at 16:06

## 2022-03-28 RX ADMIN — EPINEPHRINE 10 MCG: 1 INJECTION, SOLUTION, CONCENTRATE INTRAVENOUS at 16:22

## 2022-03-28 RX ADMIN — SODIUM CITRATE AND CITRIC ACID MONOHYDRATE 30 ML: 500; 334 SOLUTION ORAL at 14:56

## 2022-03-28 RX ADMIN — SODIUM CHLORIDE, POTASSIUM CHLORIDE, SODIUM LACTATE AND CALCIUM CHLORIDE: 600; 310; 30; 20 INJECTION, SOLUTION INTRAVENOUS at 15:57

## 2022-03-28 ASSESSMENT — PULMONARY FUNCTION TESTS
PIF_VALUE: 0
PIF_VALUE: 1
PIF_VALUE: 0
PIF_VALUE: 1
PIF_VALUE: 0
PIF_VALUE: 1
PIF_VALUE: 0

## 2022-03-28 ASSESSMENT — PAIN DESCRIPTION - ONSET: ONSET: GRADUAL

## 2022-03-28 ASSESSMENT — PAIN DESCRIPTION - DESCRIPTORS: DESCRIPTORS: ACHING;SORE

## 2022-03-28 ASSESSMENT — ENCOUNTER SYMPTOMS: SHORTNESS OF BREATH: 0

## 2022-03-28 ASSESSMENT — PAIN - FUNCTIONAL ASSESSMENT
PAIN_FUNCTIONAL_ASSESSMENT: ACTIVITIES ARE NOT PREVENTED

## 2022-03-28 ASSESSMENT — PAIN SCALES - GENERAL
PAINLEVEL_OUTOF10: 3
PAINLEVEL_OUTOF10: 0

## 2022-03-28 ASSESSMENT — PAIN DESCRIPTION - PAIN TYPE: TYPE: SURGICAL PAIN

## 2022-03-28 ASSESSMENT — PAIN DESCRIPTION - PROGRESSION: CLINICAL_PROGRESSION: GRADUALLY WORSENING

## 2022-03-28 ASSESSMENT — PAIN DESCRIPTION - FREQUENCY: FREQUENCY: INTERMITTENT

## 2022-03-28 ASSESSMENT — PAIN DESCRIPTION - ORIENTATION: ORIENTATION: MID

## 2022-03-28 ASSESSMENT — PAIN DESCRIPTION - LOCATION: LOCATION: INCISION

## 2022-03-28 NOTE — FLOWSHEET NOTE
Patient is a  at 37.5 week gestation here for Increased BP's . Patient was seen in Dr Onofre Gomez office today and was sent directly over to Triage. Patient states vaginal exam . EFM and Amesti monitors applied to central bank monitoring. Physical assessment complete. Pt states infant moving. Denies HA, blurry vision, or epigastric pain. OB and medical history obtained. Pt and spouse oriented to room. 1220- IV started and labs drawn per Eleno Sevilla RN. Dr Mirza Form notified and updated on patient's condition. Orders received.

## 2022-03-28 NOTE — FLOWSHEET NOTE
Report givent to [employer]. Patient recovery complete at 1915 .  Patient transferred to room 2259 on PP per [facility] RN

## 2022-03-28 NOTE — FLOWSHEET NOTE
Dr Nam Richrads at nurses stations. STates patient will be admitted and section today around 4pm. All consents signed and patient clipped and prepped for repeat  section.

## 2022-03-28 NOTE — OP NOTE
Department of Obstetrics and Gynecology   Section Note     Indications: Pt is a [phone] @ 37w5d who presented from the office for evaluation of elevated BP. Given her prior elevated Bps during this pregnancy she met criteria for gestational hypertension and thus delivery was recommended. She had a history of 2 LTCS so decision was made to proceed with repeat CS. Pre-operative Diagnosis:   1. IUP @ 37w5d  2. GHTN  3. H/o LTCS x2  4. AMA     Post-operative Diagnosis: same     Surgeon: Nila Haynes MD      Anesthesia: Spinal anesthesia     Procedure:   Repeat low transverse  section     Procedure Details   The patient was taken to the OR where spinal anesthesia was placed without difficulty. The patient was prepped and draped in the normal sterile fashion. A time-out was performed where patient identity as well as procedure to be performed were confirmed with the entire OR staff. A Pfannenstiel incision was made with scalpel. The incision was carried down through the subcutaneous tissue to the fascia. Fascial incision was made and extended transversely. The fascia was  from the underlying rectus tissue superiorly and inferiorly. The peritoneum was identified and entered. Peritoneal incision was extended longitudinally with excellent visualization of the bladder. The utero-vesical peritoneal reflection was incised transversely and the bladder flap was bluntly freed from the lower uterine segment. A low transverse uterine incision was made. Delivered from cephalic presentation was a viable female infant, wgt 6lb7oz, with Apgar scores of 8 at one minute and 9 at five minutes. After the umbilical cord was clamped and cut, the placenta was removed intact and appeared normal. The uterine outline, tubes and ovaries appeared normal. The uterine incision was closed with running locked sutures of 0 Vicryl in two layers.  A figure of 8 Vicryl suture was placed at the right corner of hysterotomy to aid in hemostasis. Surgicel hemostatic agent was also placed along the hysterotomy. The gutters were cleared of all clots and debris. A single stitch of 2-0 Vicryl was used to reapproximate the rectus muscles in the midline. The fascia was then reapproximated with running sutures of Vicryl, and the skin closed with 4-0 Vicryl. Instrument, sponge, and needle counts were correct prior to the abdominal closure and at the conclusion of the case. Findings:  Viable female infant, wgt Melissa Vilchisbaum 8/9, normal uterus, tubes and ovaries     Estimated Blood Loss:  350mls QBL           Drains: mejia catheter to gravity     UOP: 50mls clear fluid            Total IV Fluids: 1300mls           Specimens: none           Complications:  none           Disposition: PACU - hemodynamically stable.            Condition: infant stable and mother stable

## 2022-03-28 NOTE — ANESTHESIA POSTPROCEDURE EVALUATION
Department of Anesthesiology  Postprocedure Note    Patient: Jordan Bowden  MRN: [id number]  YOB: 1980  Date of evaluation: 3/28/2022  Time:  7:01 PM     Procedure Summary     Date: 22 Room / Location: [facility]    Anesthesia Start: 8476 Anesthesia Stop: 1719    Procedure:  SECTION Low transverse uterine incision @ 8224 (N/A Abdomen) Diagnosis: (  39 weeks)    Surgeons: Jacques Church MD Responsible Provider: Misbah Borjas MD    Anesthesia Type: spinal ASA Status: 2          Anesthesia Type: spinal    Ramos Phase I: Ramos Score: 9    Ramos Phase II: Ramos Score: 10    Last vitals: Reviewed and per EMR flowsheets.        Anesthesia Post Evaluation    Patient location during evaluation: bedside  Patient participation: complete - patient participated  Level of consciousness: awake and alert  Airway patency: patent  Nausea & Vomiting: no nausea and no vomiting  Complications: no  Cardiovascular status: hemodynamically stable  Respiratory status: room air, spontaneous ventilation and acceptable  Hydration status: stable    BP (!) 149/80   Pulse 63   Temp 36.5 °C (97.7 °F) (Oral)   Resp 17   Ht 5' 3\" (1.6 m)   Wt 181 lb (82.1 kg)   SpO2 98%   Breastfeeding Unknown   BMI 32.06 kg/m²

## 2022-03-28 NOTE — ANESTHESIA PROCEDURE NOTES
Spinal Block    Patient location during procedure: OB  Reason for block: primary anesthetic and at surgeon's request  Staffing  Performed: resident/CRNA   Anesthesiologist: Rogerio Puentes MD  Resident/CRNA: PATT Whalen CRNA  Preanesthetic Checklist  Completed: patient identified, IV checked, site marked, risks and benefits discussed, surgical consent, monitors and equipment checked, pre-op evaluation, timeout performed, anesthesia consent given, oxygen available and patient being monitored  Spinal Block  Patient position: sitting  Prep: ChloraPrep and site prepped and draped  Patient monitoring: continuous pulse ox and frequent blood pressure checks  Approach: midline  Location: L3/L4  Provider prep: mask and sterile gloves  Local infiltration: lidocaine  Agent: bupivacaine  Dose: 1.6  Dose: 1.6  Needle  Needle type: Pencan   Needle gauge: 25 G  Needle length: 3.5 in  Assessment  Sensory level: T4  Swirl obtained: Yes  CSF: clear  Attempts: 1  Hemodynamics: stable (vital sigs stable during procedure)  Additional Notes  Consent:  Risks, benefits, and alternatives of neuraxial anesthesia were discussed and the patient was given the opportunity to ask questions. Informed consent was obtained. A timeout was performed immediately prior to the start of the procedure. Skin Local: Lidocaine 1% 2 mL Interspace: L3-L4  Paresthesia: denies    CSF:  positive    Heme: negative   Attempts: 1   Difficulties/Complications: None  SAB Medication: 0.75% bupivacaine  1.6 mL with Duramorph 0.2mg and Fentanyl 15 mcg. The patient was returned to the supine position with left uterine displacement. She tolerated the procedure well. FHT monitored by PATT Jones CRNA  4:50 PM

## 2022-03-28 NOTE — ANESTHESIA PRE PROCEDURE
Department of Anesthesiology  Preprocedure Note       Name:  Mirtha Dobson   Age:  38 y.o.  :  1980                                          MRN:  [id number]         Date:  3/28/2022      Surgeon: Rhett Schmidt):  Barbara Delarosa MD    Procedure: Procedure(s):   SECTION Low transverse uterine incision @    Medications prior to admission:   Prior to Admission medications    Medication Sig Start Date End Date Taking?  Authorizing Provider   NIFEdipine (PROCARDIA) 10 MG capsule Take 1 capsule by mouth 4 times daily 22   Barbara Delarosa MD   aspirin 81 MG chewable tablet Take 81 mg by mouth daily  Patient not taking: Reported on 3/28/2022    Historical Provider, MD   famotidine (PEPCID) 20 MG tablet Take 20 mg by mouth 2 times daily    Historical Provider, MD   sertraline (ZOLOFT) 50 MG tablet TAKE ONE TABLET BY MOUTH DAILY 10/30/21   Felipe Galvan MD   SUMAtriptan (IMITREX) 50 MG tablet TAKE 1 TABLET BY MOUTH AT ONSET OF HEADACHE MAY REPEAT ONE TABLET IN 2 HOURS IF NEEDED MAX OF 2 TABLETS A DAY  Patient not taking: Reported on 3/28/2022 7/23/21   Felipe Galvan MD   acetaminophen (AMINOFEN) 325 MG tablet Take 2 tablets by mouth every 6 hours as needed for Pain  Patient not taking: Reported on 3/28/2022 2/7/21   Thong Strickland MD   Prenatal Vit-Fe Fumarate-FA (PRENATAL VITAMIN) 27-0.8 MG TABS Take 1 tablet by mouth daily     Historical Provider, MD       Current medications:    Current Facility-Administered Medications   Medication Dose Route Frequency Provider Last Rate Last Admin    lactated ringers infusion   IntraVENous Continuous Barbara Delarosa MD        lactated ringers bolus  1,000 mL IntraVENous Once Barbara Delarosa MD        sodium chloride flush 0.9 % injection 10 mL  10 mL IntraVENous 2 times per day Barbara Delarosa MD        sodium chloride flush 0.9 % injection 10 mL  10 mL IntraVENous PRN Barbara Delarosa MD        0.9 % sodium chloride infusion IntraVENous PRN Judith Carlos MD        citric acid-sodium citrate (BICITRA) solution 30 mL  30 mL Oral Once Judith Carlos MD        famotidine (PEPCID) 20 mg in sodium chloride (PF) 10 mL injection  20 mg IntraVENous Once Judith Carlos MD        ondansetron [facility]) injection 4 mg  4 mg IntraVENous Q6H PRN Judith Carlos MD        ceFAZolin (ANCEF) 2000 mg in dextrose 5 % 100 mL IVPB  2,000 mg IntraVENous Once Judith Carlos MD        acetaminophen (TYLENOL) tablet 975 mg  975 mg Oral On Call to [employer], MD        oxytocin (PITOCIN) 30 units in 500 mL infusion  87.3 edi-units/min IntraVENous Continuous PRN Judith Carlos MD           Allergies:  No Known Allergies    Problem List:    Patient Active Problem List   Diagnosis Code    Migraines G43.909    Anxiety F41.9    Incomplete  O03.4    Threatened  labor, third trimester O47.03    Gestational hypertension, third trimester O13.3       Past Medical History:        Diagnosis Date    Anxiety     Complication of anesthesia     pt states,\" I passed out after my epidural.\"    Heart abnormality     Hypertension     this visit    Mental disorder     Zoloft 50 mg daily    Miscarriage        Past Surgical History:        Procedure Laterality Date    ABDOMEN SURGERY      c/s x2     SECTION  13, 2016    Boy, Girl    COLONOSCOPY      DILATION AND CURETTAGE OF UTERUS N/A 2021    DILATATION AND CURETTAGE SUCTION performed by Mattie Corbett MD at [facility] L[facility]D OR    WISDOM TOOTH EXTRACTION         Social History:    Social History     Tobacco Use    Smoking status: Never Smoker    Smokeless tobacco: Never Used   Substance Use Topics    Alcohol use: Not Currently                                Counseling given: Not Answered      Vital Signs (Current):   Vitals:    22 1240 22 1256 22 1311 22 1326   BP: 137/81 (!) 140/81 135/82 129/83   Pulse: 61 59 56 63   Resp: 18 18 18 18 Temp:       TempSrc:       Weight:       Height:                                                  BP Readings from Last 3 Encounters:   03/28/22 129/83   02/24/22 137/68   05/18/21 108/82       NPO Status: Time of last liquid consumption: 0800                        Time of last solid consumption: 0800                        Date of last liquid consumption: 03/28/22                        Date of last solid food consumption: 03/28/22    BMI:   Wt Readings from Last 3 Encounters:   03/28/22 181 lb (82.1 kg)   02/23/22 183 lb (83 kg)   05/18/21 161 lb 3.2 oz (73.1 kg)     Body mass index is 32.06 kg/m².     CBC:   Lab Results   Component Value Date    WBC 9.2 03/28/2022    RBC 4.37 03/28/2022    HGB 13.2 03/28/2022    HCT 38.5 03/28/2022    MCV 88.0 03/28/2022    RDW 13.4 03/28/2022     03/28/2022       CMP:   Lab Results   Component Value Date     03/28/2022    K 3.8 03/28/2022    CL 99 03/28/2022    CO2 17 03/28/2022    BUN 10 03/28/2022    CREATININE 0.5 03/28/2022    GFRAA >60 03/28/2022    GFRAA >60 01/22/2013    AGRATIO 1.3 03/28/2022    LABGLOM >60 03/28/2022    GLUCOSE 80 03/28/2022    PROT 6.8 03/28/2022    PROT 7.4 01/22/2013    CALCIUM 9.8 03/28/2022    BILITOT 0.7 03/28/2022    ALKPHOS 170 03/28/2022    AST 15 03/28/2022    ALT 9 03/28/2022       Coags:   Lab Results   Component Value Date    PROTIME 11.0 02/23/2022    INR 0.97 02/23/2022    APTT 30.1 02/23/2022       HCG (If Applicable):   Lab Results   Component Value Date    PREGTESTUR Negative 06/14/2018        COVID-19 Screening (If Applicable):   Lab Results   Component Value Date    COVID19 Not Detected 02/23/2022           Anesthesia Evaluation  Patient summary reviewed and Nursing notes reviewed no history of anesthetic complications:   Airway: Mallampati: II  TM distance: >3 FB   Neck ROM: full  Mouth opening: > = 3 FB Dental: normal exam         Pulmonary:Negative Pulmonary ROS and normal exam  breath sounds clear to auscultation      (-) asthma, shortness of breath and recent URI                           Cardiovascular:Negative CV ROS  Exercise tolerance: good (>4 METS),   (+) hypertension (GHTN): moderate,     (-) CAD      Rhythm: regular  Rate: normal                    Neuro/Psych:   Negative Neuro/Psych ROS  (+) headaches (Last migraine 2021. ): migraine headaches, depression/anxiety  (anxiety. )            GI/Hepatic/Renal: Neg GI/Hepatic/Renal ROS  (+) GERD (Taking pepcid. TUMS PRN. ): well controlled,      (-) liver disease and no renal disease       Endo/Other: Negative Endo/Other ROS       (-) diabetes mellitus               Abdominal:             Vascular: negative vascular ROS. - DVT and PE. Other Findings:           Anesthesia Plan      spinal     ASA 2     (Risks/benefits of spinal anesthesia for C/S discussed, including but not limited to bleeding, infection, nerve damage, and possible conversion to general anesthetic.  )      MIPS: Prophylactic antiemetics administered. Anesthetic plan and risks discussed with patient and spouse. Use of blood products discussed with patient whom consented to blood products. Plan discussed with surgical team and attending. Attending anesthesiologist reviewed and agrees with Preprocedure content      OB History        5    Para   2    Term   2            AB   1    Living   2       SAB   1    IAB        Ectopic        Molar        Multiple        Live Births   2                [place] Lines is a 38y.o. year-old female admitted to Dr. Pilo Hackett for repeat . Gestational age is 37w6d. Her Body mass index is 32.06 kg/m². She was seen, examined and her chart was reviewed (including anesthesia, drug and allergy history). No interval changes are noted to her history and physical examination. (except as noted above).     Risks/benefits/alternatives of both neuraxial and general anesthesia were discussed and she agrees to proceed at the direction of the care team.    PATT Lomax CRNA  March 28, 2022  2:41 PM        PATT Lomax CRNA   3/28/2022

## 2022-03-28 NOTE — FLOWSHEET NOTE
Introduced to patient and spouse. Updated whiteboard and plan of care. Encouraged to call with questions/concerns. Saltines and apple juice provided per patient request.    Reviewed booklet with parents. Brought birth certificate and EPDS form to front as well as feeding log.

## 2022-03-28 NOTE — H&P
Department of Obstetrics and Gynecology   Obstetrics History and Physical        CHIEF COMPLAINT:  elevated blood pressure    HISTORY OF PRESENT ILLNESS:    Alexus Cardenas  is a 38 y.o.  female at 37w6d presents with a chief complaint as above and is being admitted for gestational hypertension & repeat CS. Patient has h/o GHTN w/prior pregnancies. Several mild ranges Bps during 3rd trimester, BP elevated in office today, patient sent over for evaluation. Discussed diagnosis of GHTN and recommendation for delivery now, patient agreeable. Also reports contractions throughout the weekend, SVE /-3 in office today. She last ate around 0800 this AM.    Estimated Due Date: Estimated Date of Delivery: 22    PRENATAL CARE: Complicated by: GHTN, H/o LTCS x2, AMA    PAST OB HISTORY:  OB History        5    Para   2    Term   2            AB   1    Living   2       SAB   1    IAB        Ectopic        Molar        Multiple        Live Births   2              Past Medical History:        Diagnosis Date    Anxiety     Complication of anesthesia     pt states,\" I passed out after my epidural.\"    Heart abnormality     Hypertension     this visit    Mental disorder     Zoloft 50 mg daily    Miscarriage      Past Surgical History:        Procedure Laterality Date    ABDOMEN SURGERY      c/s x2     SECTION  13, 2016    Boy, Girl    COLONOSCOPY      DILATION AND CURETTAGE OF UTERUS N/A 2021    DILATATION AND CURETTAGE SUCTION performed by Connor Jackson MD at [facility] L[facility]    WISDOM TOOTH EXTRACTION       Allergies:  Patient has no known allergies.   Social History:    Social History     Socioeconomic History    Marital status:      Spouse name: Not on file    Number of children: Not on file    Years of education: Not on file    Highest education level: Not on file   Occupational History    Occupation:    Tobacco Use    Smoking status: Never Smoker    Smokeless tobacco: Never Used   Vaping Use    Vaping Use: Never used   Substance and Sexual Activity    Alcohol use: Not Currently    Drug use: No    Sexual activity: Yes     Partners: Male   Other Topics Concern    Not on file   Social History Narrative    Not on file     Social Determinants of Health     Financial Resource Strain:     Difficulty of Paying Living Expenses: Not on file   Food Insecurity:     Worried About Running Out of Food in the Last Year: Not on file    Rayne of Food in the Last Year: Not on file   Transportation Needs:     Lack of Transportation (Medical): Not on file    Lack of Transportation (Non-Medical):  Not on file   Physical Activity:     Days of Exercise per Week: Not on file    Minutes of Exercise per Session: Not on file   Stress:     Feeling of Stress : Not on file   Social Connections:     Frequency of Communication with Friends and Family: Not on file    Frequency of Social Gatherings with Friends and Family: Not on file    Attends Cheondoism Services: Not on file    Active Member of [address] [place] [address] or Organizations: Not on file    Attends Club or Organization Meetings: Not on file    Marital Status: Not on file   Intimate Partner Violence:     Fear of Current or Ex-Partner: Not on file    Emotionally Abused: Not on file    Physically Abused: Not on file    Sexually Abused: Not on file   Housing Stability:     Unable to Pay for Housing in the Last Year: Not on file    Number of [place] in the Last Year: Not on file    Unstable Housing in the Last Year: Not on file     Family History:       Problem Relation Age of Onset    Arthritis Mother     Immune Disorder Mother     High Cholesterol Father     Depression Sister     Heart Attack Paternal Uncle     Heart Disease Paternal Uncle     High Blood Pressure Paternal Uncle     High Cholesterol Paternal Uncle     Diabetes Maternal Grandmother     Atrial Fibrillation Paternal Grandfather     Heart Attack Paternal Grandfather     Heart Disease Paternal Grandfather     High Cholesterol Paternal Grandfather     High Blood Pressure Paternal Grandfather      Medications Prior to Admission:  Medications Prior to Admission: NIFEdipine (PROCARDIA) 10 MG capsule, Take 1 capsule by mouth 4 times daily  aspirin 81 MG chewable tablet, Take 81 mg by mouth daily (Patient not taking: Reported on 3/28/2022)  famotidine (PEPCID) 20 MG tablet, Take 20 mg by mouth 2 times daily  sertraline (ZOLOFT) 50 MG tablet, TAKE ONE TABLET BY MOUTH DAILY  SUMAtriptan (IMITREX) 50 MG tablet, TAKE 1 TABLET BY MOUTH AT ONSET OF HEADACHE MAY REPEAT ONE TABLET IN 2 HOURS IF NEEDED MAX OF 2 TABLETS A DAY (Patient not taking: Reported on 3/28/2022)  acetaminophen (AMINOFEN) 325 MG tablet, Take 2 tablets by mouth every 6 hours as needed for Pain (Patient not taking: Reported on 3/28/2022)  Prenatal Vit-Fe Fumarate-FA (PRENATAL VITAMIN) 27-0.8 MG TABS, Take 1 tablet by mouth daily     REVIEW OF SYSTEMS:  negative     PHYSICAL EXAM:    Vitals:    03/28/22 1240 03/28/22 1256 03/28/22 1311 03/28/22 1326   BP: 137/81 (!) 140/81 135/82 129/83   Pulse: 61 59 56 63   Resp: 18 18 18 18   Temp:       TempSrc:       Weight:       Height:         General appearance:  awake, alert, cooperative, no apparent distress, and appears stated age  Neurologic:  Awake, alert, oriented to name, place and time.     Lungs:  No increased work of breathing, good air exchange  Abdomen:  Soft, non tender, gravid, fundal height consistent with the gestational age, EFW 32% (6lbs 1oz) on 3/21  Pelvis:  Adequate pelvis  Cervix: deferred  Contraction frequency: every 3 minutes  Membranes:  Intact  Labs:   CBC:   Lab Results   Component Value Date    WBC 9.2 03/28/2022    RBC 4.37 03/28/2022    HGB 13.2 03/28/2022    HCT 38.5 03/28/2022    MCV 88.0 03/28/2022    MCH 30.2 03/28/2022    MCHC 34.3 03/28/2022    RDW 13.4 03/28/2022     03/28/2022    MPV 9.0 03/28/2022 CMP:    Lab Results   Component Value Date     03/28/2022    K 3.8 03/28/2022    CL 99 03/28/2022    CO2 17 03/28/2022    BUN 10 03/28/2022    CREATININE 0.5 03/28/2022    GFRAA >60 03/28/2022    GFRAA >60 01/22/2013    AGRATIO 1.3 03/28/2022    LABGLOM >60 03/28/2022    GLUCOSE 80 03/28/2022    PROT 6.8 03/28/2022    PROT 7.4 01/22/2013    LABALBU 3.9 03/28/2022    CALCIUM 9.8 03/28/2022    BILITOT 0.7 03/28/2022    ALKPHOS 170 03/28/2022    AST 15 03/28/2022    ALT 9 03/28/2022     LDH:    Lab Results   Component Value Date     03/28/2022     Uric Acid:    Lab Results   Component Value Date    LABURIC 5.2 03/28/2022       ASSESSMENT:  Gestational hypertension, third trimester    Bps mild range, labs WNL, asymptomatic  LTCS x2   For repeat  AMA   NIPT low risk, BPP 8/8 today in office    PLAN: Admit, NPO, repeat CS @ 1600  Labor: Routine labor orders  Fetus: Reassuring  GBS: Negative    I have presented reasonable alternatives to the patient's proposed care, treatment, and services. The discussion I have done encompassed risks, benefits, and side effects related to the alternatives and the risks related to not receiving the proposed care, treatment, and services. All questions answered. Patient wishes to proceed. The surgical site was confirmed by the patient and me.       Electronically signed by Johanne Stone MD on 3/28/2022 at 2:13 PM

## 2022-03-28 NOTE — FLOWSHEET NOTE
Patient transferred to OR 2 per stretcher for repeat  section. 1556- Patient in OR 2 and transferred to OR tabler. Time Out prior to spinal anesthesia done. 1615- Morrell catheter placed. Clear yellow urine noted. Abdomen prepped with cholraprep and allowed to dry 3 minutes. 1618- Procedure time out performed. Incision made. 1624- Uterine incision. 1625- arom clear fluid. Delivery of viable female infant. Infant to radiant warmer with spontaneous cry noted. Apgars 8/9 wt 2920 g 6lbs 7 oz   1626- Placenta delivered intact.   ml. U/o 50 ml IV fluids 1300 ml   1713- Patient transferred to PACU for recovery

## 2022-03-29 LAB
HCT VFR BLD CALC: 33.9 % (ref 36–48)
HEMOGLOBIN: 11.4 G/DL (ref 12–16)
MCH RBC QN AUTO: 30.1 PG (ref 26–34)
MCHC RBC AUTO-ENTMCNC: 33.6 G/DL (ref 31–36)
MCV RBC AUTO: 89.7 FL (ref 80–100)
PDW BLD-RTO: 13.7 % (ref 12.4–15.4)
PLATELET # BLD: 221 K/UL (ref 135–450)
PMV BLD AUTO: 8.3 FL (ref 5–10.5)
RBC # BLD: 3.78 M/UL (ref 4–5.2)
WBC # BLD: 12.8 K/UL (ref 4–11)

## 2022-03-29 PROCEDURE — 85027 COMPLETE CBC AUTOMATED: CPT

## 2022-03-29 PROCEDURE — 36415 COLL VENOUS BLD VENIPUNCTURE: CPT

## 2022-03-29 PROCEDURE — 96375 TX/PRO/DX INJ NEW DRUG ADDON: CPT

## 2022-03-29 PROCEDURE — 6360000002 HC RX W HCPCS: Performed by: OBSTETRICS & GYNECOLOGY

## 2022-03-29 PROCEDURE — 2580000003 HC RX 258: Performed by: OBSTETRICS & GYNECOLOGY

## 2022-03-29 PROCEDURE — 6370000000 HC RX 637 (ALT 250 FOR IP): Performed by: OBSTETRICS & GYNECOLOGY

## 2022-03-29 PROCEDURE — 6360000002 HC RX W HCPCS: Performed by: ANESTHESIOLOGY

## 2022-03-29 PROCEDURE — 6370000000 HC RX 637 (ALT 250 FOR IP): Performed by: ANESTHESIOLOGY

## 2022-03-29 PROCEDURE — 94760 N-INVAS EAR/PLS OXIMETRY 1: CPT

## 2022-03-29 PROCEDURE — 1220000000 HC SEMI PRIVATE OB R&B

## 2022-03-29 RX ORDER — NIFEDIPINE 30 MG/1
30 TABLET, EXTENDED RELEASE ORAL DAILY
Status: DISCONTINUED | OUTPATIENT
Start: 2022-03-29 | End: 2022-03-30 | Stop reason: HOSPADM

## 2022-03-29 RX ORDER — DIPHENHYDRAMINE HCL 25 MG
25 TABLET ORAL EVERY 6 HOURS PRN
Status: DISCONTINUED | OUTPATIENT
Start: 2022-03-29 | End: 2022-03-30 | Stop reason: HOSPADM

## 2022-03-29 RX ADMIN — KETOROLAC TROMETHAMINE 30 MG: 30 INJECTION, SOLUTION INTRAMUSCULAR at 10:50

## 2022-03-29 RX ADMIN — IBUPROFEN 800 MG: 800 TABLET, FILM COATED ORAL at 20:30

## 2022-03-29 RX ADMIN — DOCUSATE SODIUM 100 MG: 100 CAPSULE, LIQUID FILLED ORAL at 20:30

## 2022-03-29 RX ADMIN — Medication 10 ML: at 20:32

## 2022-03-29 RX ADMIN — KETOROLAC TROMETHAMINE 30 MG: 30 INJECTION, SOLUTION INTRAMUSCULAR at 04:48

## 2022-03-29 RX ADMIN — PRENATAL VIT W/ FE FUMARATE-FA TAB 27-0.8 MG 1 TABLET: 27-0.8 TAB at 09:10

## 2022-03-29 RX ADMIN — SODIUM CHLORIDE, PRESERVATIVE FREE 10 ML: 5 INJECTION INTRAVENOUS at 10:50

## 2022-03-29 RX ADMIN — ENOXAPARIN SODIUM 40 MG: 40 INJECTION SUBCUTANEOUS at 04:49

## 2022-03-29 RX ADMIN — NALBUPHINE HYDROCHLORIDE 5 MG: 10 INJECTION, SOLUTION INTRAMUSCULAR; INTRAVENOUS; SUBCUTANEOUS at 09:10

## 2022-03-29 RX ADMIN — DIPHENHYDRAMINE HCL 25 MG: 25 TABLET ORAL at 16:23

## 2022-03-29 RX ADMIN — NALBUPHINE HYDROCHLORIDE 5 MG: 10 INJECTION, SOLUTION INTRAMUSCULAR; INTRAVENOUS; SUBCUTANEOUS at 04:48

## 2022-03-29 RX ADMIN — NALBUPHINE HYDROCHLORIDE 5 MG: 10 INJECTION, SOLUTION INTRAMUSCULAR; INTRAVENOUS; SUBCUTANEOUS at 13:51

## 2022-03-29 RX ADMIN — DOCUSATE SODIUM 100 MG: 100 CAPSULE, LIQUID FILLED ORAL at 07:58

## 2022-03-29 RX ADMIN — DIPHENHYDRAMINE HCL 25 MG: 25 TABLET ORAL at 20:30

## 2022-03-29 RX ADMIN — NIFEDIPINE 30 MG: 30 TABLET, FILM COATED, EXTENDED RELEASE ORAL at 10:50

## 2022-03-29 RX ADMIN — SODIUM CHLORIDE, PRESERVATIVE FREE 5 ML: 5 INJECTION INTRAVENOUS at 13:51

## 2022-03-29 ASSESSMENT — PAIN SCALES - GENERAL
PAINLEVEL_OUTOF10: 0
PAINLEVEL_OUTOF10: 3
PAINLEVEL_OUTOF10: 0
PAINLEVEL_OUTOF10: 0

## 2022-03-29 ASSESSMENT — PAIN DESCRIPTION - DESCRIPTORS
DESCRIPTORS: ACHING;SORE

## 2022-03-29 ASSESSMENT — PAIN - FUNCTIONAL ASSESSMENT
PAIN_FUNCTIONAL_ASSESSMENT: ACTIVITIES ARE NOT PREVENTED

## 2022-03-29 ASSESSMENT — PAIN DESCRIPTION - LOCATION
LOCATION: INCISION

## 2022-03-29 ASSESSMENT — PAIN DESCRIPTION - PAIN TYPE
TYPE: ACUTE PAIN
TYPE: SURGICAL PAIN
TYPE: SURGICAL PAIN;ACUTE PAIN
TYPE: SURGICAL PAIN

## 2022-03-29 ASSESSMENT — PAIN DESCRIPTION - FREQUENCY
FREQUENCY: INTERMITTENT
FREQUENCY: INTERMITTENT
FREQUENCY: CONTINUOUS
FREQUENCY: INTERMITTENT

## 2022-03-29 ASSESSMENT — PAIN DESCRIPTION - ONSET
ONSET: GRADUAL

## 2022-03-29 ASSESSMENT — PAIN DESCRIPTION - PROGRESSION
CLINICAL_PROGRESSION: GRADUALLY IMPROVING
CLINICAL_PROGRESSION: GRADUALLY IMPROVING
CLINICAL_PROGRESSION: NOT CHANGED
CLINICAL_PROGRESSION: GRADUALLY WORSENING
CLINICAL_PROGRESSION: NOT CHANGED

## 2022-03-29 ASSESSMENT — PAIN DESCRIPTION - ORIENTATION
ORIENTATION: MID

## 2022-03-29 NOTE — FLOWSHEET NOTE
Vital signs, assessments and care provided by Kelton Bustillos Student Nurse under my direct supervision.

## 2022-03-29 NOTE — PROGRESS NOTES
[facility] Department of Anesthesiology  Post-Anesthesia Note       Name:  Karmen Merlin                                         Age:  38 y.o.   MRN:  [id number]     Last Vitals & Oxygen Saturation: /84   Pulse 63   Temp 36.7 °C (98 °F) (Oral)   Resp 16   Ht 5' 3\" (1.6 m)   Wt 181 lb (82.1 kg)   SpO2 96%   Breastfeeding Unknown   BMI 32.06 kg/m²   Patient Vitals for the past 4 hrs:   BP Temp Temp src Pulse Resp SpO2   03/29/22 1239 124/84 36.7 °C (98 °F) Oral 63 16 96 %       Level of consciousness: awake, alert and oriented    Respiratory: stable     Cardiovascular: stable     Hydration: stable     PONV: stable     Post-op pain: adequate analgesia    Post-op assessment: no apparent anesthetic complications and tolerated procedure well    Complications:  none    PATT Griggs CRNA  March 29, 2022   12:58 PM

## 2022-03-29 NOTE — FLOWSHEET NOTE
At 2130 patient had stated she felt warm. Room temperature at 76, holding infant skin to skin. Patient's skin did not feel hot to the touch. Room temperature decreased, blankets removed, infant in crib, encouraged ice and drinking. Now she states she feels much better, more jello and ice provided.

## 2022-03-29 NOTE — PLAN OF CARE
Problem: Discharge Planning:  Goal: Discharged to appropriate level of care  Description: Discharged to appropriate level of care  Outcome: Ongoing     Problem: Fluid Volume - Imbalance:  Goal: Absence of postpartum hemorrhage signs and symptoms  Description: Absence of postpartum hemorrhage signs and symptoms  Outcome: Ongoing  Goal: Absence of imbalanced fluid volume signs and symptoms  Description: Absence of imbalanced fluid volume signs and symptoms  Outcome: Ongoing     Problem: Infection - Surgical Site:  Goal: Will show no infection signs and symptoms  Description: Will show no infection signs and symptoms  Outcome: Ongoing     Problem: Mood - Altered:  Goal: Mood stable  Description: Mood stable  Outcome: Ongoing     Problem: Nausea/Vomiting:  Goal: Absence of nausea/vomiting  Description: Absence of nausea/vomiting  Outcome: Ongoing     Problem: Pain - Acute:  Goal: Pain level will decrease  Description: Pain level will decrease  Outcome: Ongoing     Problem: Urinary Retention:  Goal: Urinary elimination within specified parameters  Description: Urinary elimination within specified parameters  Outcome: Ongoing     Problem: Venous Thromboembolism:  Goal: Will show no signs or symptoms of venous thromboembolism  Description: Will show no signs or symptoms of venous thromboembolism  Outcome: Ongoing  Goal: Absence of signs or symptoms of impaired coagulation  Description: Absence of signs or symptoms of impaired coagulation  Outcome: Ongoing     Problem: Skin Integrity:  Goal: Will show no infection signs and symptoms  Description: Will show no infection signs and symptoms  Outcome: Ongoing  Goal: Absence of new skin breakdown  Description: Absence of new skin breakdown  Outcome: Ongoing

## 2022-03-29 NOTE — FLOWSHEET NOTE
Pt complaints of itching and requests Nubain be given. Pt reports that itching has improved from yesterday. Administered IVP Nubain.

## 2022-03-29 NOTE — FLOWSHEET NOTE
Morrell emptied. Up to bathroom for Morrell and beverley-care. Pads and panties placed. Up to chair. Phone, call light personal cell phone, water and menu provided. Tolerated well.

## 2022-03-29 NOTE — FLOWSHEET NOTE
Removed mejia catheter. Instructed pt to call nurse to get up and void. Encouraged pt to increase fluid intake. Call light within reach.

## 2022-03-29 NOTE — FLOWSHEET NOTE
Pt states itching and requested Nubain be given. Pt reports that itching is not as bad as last night. IVP Nubain provided. Call light in reach and ice water provided. SCDs on and running.

## 2022-03-29 NOTE — FLOWSHEET NOTE
Re-introduced to patient and spouse. Updated whiteboard and plan of care. Encouraged to call with questions/concerns.

## 2022-03-29 NOTE — PROGRESS NOTES
POD1 RCS, GHTN  No c/o's. + flatus, meggan reg diet  Looks well  Soft nt nd; c/d/i  Procardia XL 30 qd started. Cont current care.

## 2022-03-29 NOTE — FLOWSHEET NOTE
Patient will be getting beverley-care and up to chair after 0600 at her request.  Will try to get some sleep at this time.

## 2022-03-29 NOTE — FLOWSHEET NOTE
Vital signs, assessments and care provided by Barbara Conner  Student Nurse under my direct supervision.

## 2022-03-29 NOTE — PLAN OF CARE
Impression: Presence of intraocular lens: Z96.1. OU.  Plan: Observe Problem: Discharge Planning:  Goal: Discharged to appropriate level of care  Description: Discharged to appropriate level of care  3/29/2022 1751 by Angelica Resendez RN  Outcome: Met This Shift  3/29/2022 0518 by Brent Taylor RN  Outcome: Ongoing     Problem: Fluid Volume - Imbalance:  Goal: Absence of postpartum hemorrhage signs and symptoms  Description: Absence of postpartum hemorrhage signs and symptoms  3/29/2022 1751 by Angelica Resendez RN  Outcome: Met This Shift  3/29/2022 0518 by Brent Taylor RN  Outcome: Ongoing  Goal: Absence of imbalanced fluid volume signs and symptoms  Description: Absence of imbalanced fluid volume signs and symptoms  3/29/2022 1751 by Angelica Resendez RN  Outcome: Met This Shift  3/29/2022 0518 by Brent Taylor RN  Outcome: Ongoing     Problem: Infection - Surgical Site:  Goal: Will show no infection signs and symptoms  Description: Will show no infection signs and symptoms  3/29/2022 1751 by Angelica Resendez RN  Outcome: Met This Shift  3/29/2022 0518 by Brent Taylor RN  Outcome: Ongoing     Problem: Mood - Altered:  Goal: Mood stable  Description: Mood stable  3/29/2022 1751 by Angelica Resendez RN  Outcome: Met This Shift  3/29/2022 0518 by Brent Taylor RN  Outcome: Ongoing     Problem: Nausea/Vomiting:  Goal: Absence of nausea/vomiting  Description: Absence of nausea/vomiting  3/29/2022 1751 by Angelica Resendez RN  Outcome: Met This Shift  3/29/2022 0518 by Brent Taylor RN  Outcome: Ongoing     Problem: Pain - Acute:  Goal: Pain level will decrease  Description: Pain level will decrease  3/29/2022 1751 by Angelica Resendez RN  Outcome: Met This Shift  3/29/2022 0518 by Brent Taylor RN  Outcome: Ongoing     Problem: Urinary Retention:  Goal: Urinary elimination within specified parameters  Description: Urinary elimination within specified parameters  3/29/2022 1751 by Angelica Resendez RN  Outcome: Met This Shift  3/29/2022 0518 by Brent Taylor RN  Outcome: Ongoing     Problem: Venous Thromboembolism:  Goal: Will show no signs or symptoms of venous thromboembolism  Description: Will show no signs or symptoms of venous thromboembolism  3/29/2022 1751 by Charanjit Mendez RN  Outcome: Met This Shift  3/29/2022 0518 by Wil Akers RN  Outcome: Ongoing  Goal: Absence of signs or symptoms of impaired coagulation  Description: Absence of signs or symptoms of impaired coagulation  3/29/2022 1751 by Charanjit Mendez RN  Outcome: Met This Shift  3/29/2022 0518 by Wil Akers RN  Outcome: Ongoing     Problem: Skin Integrity:  Goal: Will show no infection signs and symptoms  Description: Will show no infection signs and symptoms  3/29/2022 1751 by Charanjit Mendez RN  Outcome: Met This Shift  3/29/2022 0518 by Wil Akers RN  Outcome: Ongoing  Goal: Absence of new skin breakdown  Description: Absence of new skin breakdown  3/29/2022 1751 by Charanjit Mendez RN  Outcome: Met This Shift  3/29/2022 0518 by Wil Akers RN  Outcome: Ongoing

## 2022-03-30 VITALS
HEART RATE: 76 BPM | DIASTOLIC BLOOD PRESSURE: 74 MMHG | RESPIRATION RATE: 20 BRPM | HEIGHT: 63 IN | TEMPERATURE: 98.4 F | WEIGHT: 181 LBS | OXYGEN SATURATION: 96 % | BODY MASS INDEX: 32.07 KG/M2 | SYSTOLIC BLOOD PRESSURE: 128 MMHG

## 2022-03-30 PROCEDURE — 90707 MMR VACCINE SC: CPT | Performed by: OBSTETRICS & GYNECOLOGY

## 2022-03-30 PROCEDURE — 90471 IMMUNIZATION ADMIN: CPT | Performed by: OBSTETRICS & GYNECOLOGY

## 2022-03-30 PROCEDURE — 6370000000 HC RX 637 (ALT 250 FOR IP): Performed by: OBSTETRICS & GYNECOLOGY

## 2022-03-30 PROCEDURE — 6370000000 HC RX 637 (ALT 250 FOR IP): Performed by: ANESTHESIOLOGY

## 2022-03-30 PROCEDURE — 6360000002 HC RX W HCPCS: Performed by: OBSTETRICS & GYNECOLOGY

## 2022-03-30 RX ORDER — IBUPROFEN 800 MG/1
800 TABLET ORAL EVERY 8 HOURS PRN
Qty: 40 TABLET | Refills: 1 | Status: SHIPPED | OUTPATIENT
Start: 2022-03-30 | End: 2022-10-31 | Stop reason: ALTCHOICE

## 2022-03-30 RX ORDER — DOCUSATE SODIUM 100 MG/1
100 CAPSULE, LIQUID FILLED ORAL 2 TIMES DAILY PRN
Qty: 60 CAPSULE | Refills: 1 | Status: SHIPPED | OUTPATIENT
Start: 2022-03-30 | End: 2022-10-31 | Stop reason: ALTCHOICE

## 2022-03-30 RX ORDER — NIFEDIPINE 30 MG/1
30 TABLET, EXTENDED RELEASE ORAL DAILY
Qty: 30 TABLET | Refills: 1 | Status: SHIPPED | OUTPATIENT
Start: 2022-03-31 | End: 2022-10-31 | Stop reason: ALTCHOICE

## 2022-03-30 RX ORDER — OXYCODONE HYDROCHLORIDE AND ACETAMINOPHEN 5; 325 MG/1; MG/1
1 TABLET ORAL EVERY 6 HOURS PRN
Qty: 28 TABLET | Refills: 0 | Status: SHIPPED | OUTPATIENT
Start: 2022-03-30 | End: 2022-04-06

## 2022-03-30 RX ADMIN — MEASLES, MUMPS, AND RUBELLA VIRUS VACCINE LIVE 0.5 ML: 1000; 12500; 1000 INJECTION, POWDER, LYOPHILIZED, FOR SUSPENSION SUBCUTANEOUS at 17:02

## 2022-03-30 RX ADMIN — DIPHENHYDRAMINE HCL 25 MG: 25 TABLET ORAL at 05:51

## 2022-03-30 RX ADMIN — IBUPROFEN 800 MG: 800 TABLET, FILM COATED ORAL at 13:52

## 2022-03-30 RX ADMIN — DOCUSATE SODIUM 100 MG: 100 CAPSULE, LIQUID FILLED ORAL at 09:02

## 2022-03-30 RX ADMIN — IBUPROFEN 800 MG: 800 TABLET, FILM COATED ORAL at 05:51

## 2022-03-30 RX ADMIN — PRENATAL VIT W/ FE FUMARATE-FA TAB 27-0.8 MG 1 TABLET: 27-0.8 TAB at 09:02

## 2022-03-30 RX ADMIN — ENOXAPARIN SODIUM 40 MG: 40 INJECTION SUBCUTANEOUS at 09:02

## 2022-03-30 RX ADMIN — NIFEDIPINE 30 MG: 30 TABLET, FILM COATED, EXTENDED RELEASE ORAL at 09:02

## 2022-03-30 ASSESSMENT — PAIN DESCRIPTION - PROGRESSION
CLINICAL_PROGRESSION: GRADUALLY WORSENING
CLINICAL_PROGRESSION: NOT CHANGED
CLINICAL_PROGRESSION: GRADUALLY IMPROVING

## 2022-03-30 ASSESSMENT — PAIN DESCRIPTION - LOCATION
LOCATION: INCISION

## 2022-03-30 ASSESSMENT — PAIN SCALES - GENERAL
PAINLEVEL_OUTOF10: 2
PAINLEVEL_OUTOF10: 0
PAINLEVEL_OUTOF10: 2
PAINLEVEL_OUTOF10: 0
PAINLEVEL_OUTOF10: 3
PAINLEVEL_OUTOF10: 2

## 2022-03-30 ASSESSMENT — PAIN - FUNCTIONAL ASSESSMENT
PAIN_FUNCTIONAL_ASSESSMENT: ACTIVITIES ARE NOT PREVENTED

## 2022-03-30 ASSESSMENT — PAIN DESCRIPTION - FREQUENCY
FREQUENCY: INTERMITTENT
FREQUENCY: INTERMITTENT

## 2022-03-30 ASSESSMENT — PAIN DESCRIPTION - PAIN TYPE
TYPE: SURGICAL PAIN

## 2022-03-30 ASSESSMENT — PAIN DESCRIPTION - ONSET
ONSET: ON-GOING
ONSET: GRADUAL

## 2022-03-30 ASSESSMENT — PAIN DESCRIPTION - DESCRIPTORS
DESCRIPTORS: ACHING;SORE
DESCRIPTORS: SORE

## 2022-03-30 NOTE — PROGRESS NOTES
Department of Obstetrics and Gynecology   Postpartum Rounds    SUBJECTIVE:  Pain is controlled with non-steroidal anti-inflammatory drugs or narcotic analgesics. The patient is tolerating regular diet. She is ambulating. Her lochia is normal.    OBJECTIVE:  Vital Signs: BP (!) 145/78   Pulse 72   Temp 98.3 °F (36.8 °C) (Oral)   Resp 16   Ht 5' 3\" (1.6 m)   Wt 181 lb (82.1 kg)   SpO2 95%   Breastfeeding Unknown   BMI 32.06 kg/m²   Appearance/Psychiatric: awake, alert, cooperative, no apparent distress, appears stated age  Constitutional: The patient is well nourished. Cardiovascular: She does not have edema.   Respiratory: Respiratory effort is normal.  Gastrointestinal: Soft, appropriately tender, uterine fundus is firm below umbilicus  The incision is clean, dry and intact  Extremities: nontender to palpation    LABS / IMAGING:  Component      Latest Ref Rng & Units 3/29/2022 3/28/2022 3/28/2022 3/28/2022           8:07 AM  3:41 PM 12:20 PM 12:20 PM   Sodium      136 - 145 mmol/L   133 (L)    Potassium      3.5 - 5.1 mmol/L   3.8    Chloride      99 - 110 mmol/L   99    CO2      21 - 32 mmol/L   17 (L)    Anion Gap      3 - 16   17 (H)    GLUCOSE, FASTING,GF      70 - 99 mg/dL   80    BUN,BUNPL      7 - 20 mg/dL   10    Creatinine      0.6 - 1.1 mg/dL   0.5 (L)    GFR Non-      >60   >60    GFR       >60   >60    CALCIUM, SERUM, 378831      8.3 - 10.6 mg/dL   9.8    Total Protein      6.4 - 8.2 g/dL   6.8    Albumin      3.4 - 5.0 g/dL   3.9    Albumin/Globulin Ratio      1.1 - 2.2   1.3    Bilirubin      0.0 - 1.0 mg/dL   0.7    Alk Phos      40 - 129 U/L   170 (H)    ALT      10 - 40 U/L   9 (L)    AST      15 - 37 U/L   15    WBC      4.0 - 11.0 K/uL 12.8 (H)   9.2   RBC      4.00 - 5.20 M/uL 3.78 (L)   4.37   Hemoglobin Quant      12.0 - 16.0 g/dL 11.4 (L)   13.2   Hematocrit      36.0 - 48.0 % 33.9 (L)   38.5   MCV      80.0 - 100.0 fL 89.7   88.0   MCH      26.0 - 34.0 pg 30.1   30.2   MCHC      31.0 - 36.0 g/dL 33.6   34.3   RDW      12.4 - 15.4 % 13.7   13.4   Platelet Count      182 - 450 K/uL 221   245   MPV      5.0 - 10.5 fL 8.3   9.0   Protein, Ur      <12 mg/dL  11.00     Creatinine, Ur      28.0 - 259.0 mg/dL  43.2     Protein/Creat Ratio      mg/dL  0.3         ASSESSMENT:    Postoperative Day 2 s/p RLTCS, gestational HTN     PLAN:   1. Advance postoperative care as tolerated  2. Gestational HTN - asymptomatic   - started on Procardia 30mg XL yesterday; BP's normal to mild-range  3. Discharge home on Postpartum Day 2  4. Return to office in 1 week for BP check  5.  Postpartum instructions reviewed and all patient's Questions answered    Electronically signed by Jagdish Garcia MD on 3/30/2022 at 8:47 AM

## 2022-03-30 NOTE — PLAN OF CARE
Problem: Discharge Planning:  Goal: Discharged to appropriate level of care  Description: Discharged to appropriate level of care  3/30/2022 0558 by Ricky Toney RN  Outcome: Met This Shift  3/29/2022 1751 by Valentin Han RN  Outcome: Met This Shift     Problem: Fluid Volume - Imbalance:  Goal: Absence of postpartum hemorrhage signs and symptoms  Description: Absence of postpartum hemorrhage signs and symptoms  3/30/2022 0558 by Ricky Toney RN  Outcome: Met This Shift  3/29/2022 1751 by Valentin Han RN  Outcome: Met This Shift  Goal: Absence of imbalanced fluid volume signs and symptoms  Description: Absence of imbalanced fluid volume signs and symptoms  3/30/2022 0558 by Ricky Toney RN  Outcome: Met This Shift  3/29/2022 1751 by Valentin Han RN  Outcome: Met This Shift     Problem: Infection - Surgical Site:  Goal: Will show no infection signs and symptoms  Description: Will show no infection signs and symptoms  3/30/2022 0558 by Ricky Toney RN  Outcome: Met This Shift  3/29/2022 1751 by Valentin Han RN  Outcome: Met This Shift     Problem: Mood - Altered:  Goal: Mood stable  Description: Mood stable  3/30/2022 0558 by Ricky Toney RN  Outcome: Met This Shift  3/29/2022 1751 by Valentin Han RN  Outcome: Met This Shift     Problem: Nausea/Vomiting:  Goal: Absence of nausea/vomiting  Description: Absence of nausea/vomiting  3/30/2022 0558 by Ricky Toney RN  Outcome: Met This Shift  3/29/2022 1751 by Valentin Han RN  Outcome: Met This Shift     Problem: Pain - Acute:  Goal: Pain level will decrease  Description: Pain level will decrease  3/30/2022 0558 by Ricky Toney RN  Outcome: Met This Shift  3/29/2022 1751 by Valentin Han RN  Outcome: Met This Shift     Problem: Urinary Retention:  Goal: Urinary elimination within specified parameters  Description: Urinary elimination within specified parameters  3/30/2022 0558 by Ricky Toney RN  Outcome: Met This Shift  3/29/2022 1751 by Natalie Yin RN  Outcome: Met This Shift     Problem: Venous Thromboembolism:  Goal: Will show no signs or symptoms of venous thromboembolism  Description: Will show no signs or symptoms of venous thromboembolism  3/30/2022 0558 by Regine Mazariegos RN  Outcome: Met This Shift  3/29/2022 1751 by Natalie Yin RN  Outcome: Met This Shift  Goal: Absence of signs or symptoms of impaired coagulation  Description: Absence of signs or symptoms of impaired coagulation  3/30/2022 0558 by Regine Mazariegos RN  Outcome: Met This Shift  3/29/2022 1751 by Natalie Yin RN  Outcome: Met This Shift     Problem: Skin Integrity:  Goal: Will show no infection signs and symptoms  Description: Will show no infection signs and symptoms  3/30/2022 0558 by Regine Mazariegos RN  Outcome: Met This Shift  3/29/2022 1751 by Natalie Yin RN  Outcome: Met This Shift  Goal: Absence of new skin breakdown  Description: Absence of new skin breakdown  3/30/2022 0558 by Regine Mazariegos RN  Outcome: Met This Shift  3/29/2022 1751 by Natalie Yin RN  Outcome: Met This Shift

## 2022-03-30 NOTE — LACTATION NOTE
This note was copied from a baby's chart. Lactation Progress Note  Initial Consult    Data: Referral received per RN. Action: LC to room. Mother resting in bed. Infant sleeping skin to skin, Provided breastfeeding booklet to MOB and [facility] office number. MOB declined consult or any needs at this time. Provided name and number on board. Response: Mother verbalizes understanding of information given and denies further needs at this time.

## 2022-03-30 NOTE — DISCHARGE SUMMARY
Department of Obstetrics and Gynecology  Postpartum Discharge Summary      Admit Date: 3/28/2022    Admit Diagnosis: Gestational hypertension, third trimester [O13.3]    Discharge Date: 3/30/2022 (any delay in discharge is attributed to  reasons)    Discharge Diagnoses: repeat C section       Medication List      START taking these medications    docusate sodium 100 MG capsule  Commonly known as: COLACE  Take 1 capsule by mouth 2 times daily as needed for Constipation     ibuprofen 800 MG tablet  Commonly known as: ADVIL;MOTRIN  Take 1 tablet by mouth every 8 hours as needed for Pain     NIFEdipine 30 MG extended release tablet  Commonly known as: PROCARDIA XL  Take 1 tablet by mouth daily  Start taking on: 2022  Replaces: NIFEdipine 10 MG capsule     oxyCODONE-acetaminophen 5-325 MG per tablet  Commonly known as: PERCOCET  Take 1 tablet by mouth every 6 hours as needed for Pain for up to 7 days.         CONTINUE taking these medications    famotidine 20 MG tablet  Commonly known as: PEPCID     prenatal vitamin 27-0.8 MG Tabs     sertraline 50 MG tablet  Commonly known as: ZOLOFT  TAKE ONE TABLET BY MOUTH DAILY     SUMAtriptan 50 MG tablet  Commonly known as: IMITREX  TAKE 1 TABLET BY MOUTH AT ONSET OF HEADACHE MAY REPEAT ONE TABLET IN 2 HOURS IF NEEDED MAX OF 2 TABLETS A DAY        STOP taking these medications    acetaminophen 325 MG tablet  Commonly known as: Aminofen     aspirin 81 MG chewable tablet     NIFEdipine 10 MG capsule  Commonly known as: PROCARDIA  Replaced by: NIFEdipine 30 MG extended release tablet           Where to Get Your Medications      These medications were sent to [facility] [address] [phone]  [address] [place] [address]    Phone: [phone]   · docusate sodium 100 MG capsule  · ibuprofen 800 MG tablet  · NIFEdipine 30 MG extended release tablet  · oxyCODONE-acetaminophen 5-325 MG per tablet Service: Obstetrics    Consults: none    Significant Diagnostic Studies: none    Postpartum complications: none     Condition at Discharge: good    Hospital Course: Pt presented to L&D for evaluation of elevated BPs. She met criteria for gestational HTN and given her term gestational age, requirements for delivery. RLTCS was performed. Please see operative note for details. Her PP course was unremarkable. She was started on Procerdia 30mg XL for BP control and discharghed home in stable condition with a plan for clsoe outpatient follow up. Discharge Instructions: Activity: no lifting, Driving, or Strenuous exercise for 6 weeks, no sex for 6 weeks and, otherwise, as tolerated    Diet: regular diet    Instructions: No intercourse and nothing in the vagina for 6 weeks. Do not drive while using pain medications.  Keep any wounds clean and dry    Discharge to: Home    Disposition / Follow up: Return to office in 1 week for BP check     Home Health Nurse visit within 24-48 h if qualifies     Data:  Weight   Information for the patient's :  Milton Clamp [[id number]]        Apgars   Information for the patient's :  Milton Clamp [[id number]]         Home with mother    Electronically signed by Jevon Berg MD on 3/30/2022 at 9:44 AM

## 2022-03-30 NOTE — FLOWSHEET NOTE
Postpartum and infant care teaching completed and forms signed by patient. Copy witnessed by RN and given to patient. Patient verbalized understanding of all teaching points. Patient plans to follow-up with [facility] Provider as instructed. Patient verbalizes understanding of discharge instructions and denies further questions. ID bands checked. Mother's ID band and one of baby's ID bands removed and taped to footprint sheet, signed by patient and witnessed by RN. Patient discharged in stable condition accompanied by family. Discharged in wheelchair, holding baby in car seat.

## 2022-03-30 NOTE — PLAN OF CARE
Problem: Discharge Planning:  Goal: Discharged to appropriate level of care  Description: Discharged to appropriate level of care  3/30/2022 1554 by JAKE Martell  Outcome: Completed  3/30/2022 0558 by Manuel Staton RN  Outcome: Met This Shift     Problem: Fluid Volume - Imbalance:  Goal: Absence of postpartum hemorrhage signs and symptoms  Description: Absence of postpartum hemorrhage signs and symptoms  3/30/2022 1554 by JAKE Martell  Outcome: Completed  3/30/2022 0558 by Manuel Staton RN  Outcome: Met This Shift  Goal: Absence of imbalanced fluid volume signs and symptoms  Description: Absence of imbalanced fluid volume signs and symptoms  3/30/2022 1554 by JAKE Martell  Outcome: Completed  3/30/2022 0558 by Manuel Staton RN  Outcome: Met This Shift     Problem: Infection - Surgical Site:  Goal: Will show no infection signs and symptoms  Description: Will show no infection signs and symptoms  3/30/2022 1554 by JAKE Martell  Outcome: Completed  3/30/2022 0558 by Manuel Staton RN  Outcome: Met This Shift     Problem: Mood - Altered:  Goal: Mood stable  Description: Mood stable  3/30/2022 1554 by JAKE Martell  Outcome: Completed  3/30/2022 0558 by Manuel Staton RN  Outcome: Met This Shift     Problem: Nausea/Vomiting:  Goal: Absence of nausea/vomiting  Description: Absence of nausea/vomiting  3/30/2022 1554 by JAKE Martell  Outcome: Completed  3/30/2022 0558 by Manuel Staton RN  Outcome: Met This Shift     Problem: Pain - Acute:  Goal: Pain level will decrease  Description: Pain level will decrease  3/30/2022 1554 by JAKE Martell  Outcome: Completed  3/30/2022 0558 by Manuel Staton RN  Outcome: Met This Shift     Problem: Urinary Retention:  Goal: Urinary elimination within specified parameters  Description: Urinary elimination within specified parameters  3/30/2022 1554 by JAKE Martell  Outcome: Completed  3/30/2022 0558 by David Javier RN  Outcome: Met This Shift     Problem: Venous Thromboembolism:  Goal: Will show no signs or symptoms of venous thromboembolism  Description: Will show no signs or symptoms of venous thromboembolism  3/30/2022 1554 by Jocelyne Franco RN  Outcome: Completed  3/30/2022 0558 by David Javier RN  Outcome: Met This Shift  Goal: Absence of signs or symptoms of impaired coagulation  Description: Absence of signs or symptoms of impaired coagulation  3/30/2022 1554 by Jocelyne Franco RN  Outcome: Completed  3/30/2022 0558 by David Javier RN  Outcome: Met This Shift     Problem: Skin Integrity:  Goal: Will show no infection signs and symptoms  Description: Will show no infection signs and symptoms  3/30/2022 1554 by Jocelyne Franco RN  Outcome: Completed  3/30/2022 0558 by David Javier RN  Outcome: Met This Shift  Goal: Absence of new skin breakdown  Description: Absence of new skin breakdown  3/30/2022 1554 by Jocelyne Franco RN  Outcome: Completed  3/30/2022 0558 by David Javier RN  Outcome: Met This Shift

## 2022-03-30 NOTE — FLOWSHEET NOTE
Awake, and alert. VSS  Assessment unremarkable. Patient states pain level 2. Incision well approximated with steri strips old drainage noted Eating breakfast,bonding well with infant. Discussed planning for discharge. Verbalized understanding.

## 2022-07-11 NOTE — FLOWSHEET NOTE
Pt continuing to complain of itching po Benadryl given That is great news.  Appreciate update.      Please clarify with [facility] that they have received the start form and what steps need to be taking going further with regard to clearance as per [facility] protocol  .

## 2023-05-08 ENCOUNTER — TELEPHONE (OUTPATIENT)
Dept: ADMINISTRATIVE | Age: 43
End: 2023-05-08

## 2024-02-07 ENCOUNTER — APPOINTMENT (OUTPATIENT)
Dept: GENERAL RADIOLOGY | Age: 44
End: 2024-02-07
Payer: COMMERCIAL

## 2024-02-07 ENCOUNTER — HOSPITAL ENCOUNTER (EMERGENCY)
Age: 44
Discharge: HOME | End: 2024-02-07
Payer: COMMERCIAL

## 2024-02-07 VITALS
OXYGEN SATURATION: 99 % | TEMPERATURE: 98.6 F | SYSTOLIC BLOOD PRESSURE: 136 MMHG | RESPIRATION RATE: 18 BRPM | WEIGHT: 176.59 LBS | BODY MASS INDEX: 31.29 KG/M2 | DIASTOLIC BLOOD PRESSURE: 75 MMHG | HEART RATE: 88 BPM

## 2024-02-07 DIAGNOSIS — S99.922A INJURY OF LEFT ANKLE AND FOOT, INITIAL ENCOUNTER: Primary | ICD-10-CM

## 2024-02-07 DIAGNOSIS — S99.912A INJURY OF LEFT ANKLE AND FOOT, INITIAL ENCOUNTER: Primary | ICD-10-CM

## 2024-02-07 PROCEDURE — 73610 X-RAY EXAM OF ANKLE: CPT

## 2024-02-07 PROCEDURE — 6370000000 HC RX 637 (ALT 250 FOR IP): Performed by: NURSE PRACTITIONER

## 2024-02-07 PROCEDURE — 99283 EMERGENCY DEPT VISIT LOW MDM: CPT

## 2024-02-07 PROCEDURE — 73630 X-RAY EXAM OF FOOT: CPT

## 2024-02-07 RX ORDER — ACETAMINOPHEN 500 MG
1000 TABLET ORAL ONCE
Status: COMPLETED | OUTPATIENT
Start: 2024-02-07 | End: 2024-02-07

## 2024-02-07 RX ORDER — IBUPROFEN 400 MG/1
400 TABLET ORAL 3 TIMES DAILY PRN
Qty: 15 TABLET | Refills: 0 | Status: SHIPPED | OUTPATIENT
Start: 2024-02-07 | End: 2024-02-12

## 2024-02-07 RX ORDER — ACETAMINOPHEN 500 MG
500 TABLET ORAL 4 TIMES DAILY PRN
Qty: 28 TABLET | Refills: 0 | Status: SHIPPED | OUTPATIENT
Start: 2024-02-07 | End: 2024-02-14

## 2024-02-07 RX ADMIN — ACETAMINOPHEN 1000 MG: 500 TABLET ORAL at 13:54

## 2024-02-07 ASSESSMENT — PAIN DESCRIPTION - DESCRIPTORS
DESCRIPTORS: ACHING
DESCRIPTORS: ACHING

## 2024-02-07 ASSESSMENT — PAIN DESCRIPTION - ORIENTATION
ORIENTATION: LEFT
ORIENTATION: LEFT

## 2024-02-07 ASSESSMENT — PAIN DESCRIPTION - FREQUENCY: FREQUENCY: CONTINUOUS

## 2024-02-07 ASSESSMENT — PAIN DESCRIPTION - LOCATION
LOCATION: ANKLE
LOCATION: ANKLE

## 2024-02-07 ASSESSMENT — PAIN DESCRIPTION - PAIN TYPE: TYPE: ACUTE PAIN

## 2024-02-07 ASSESSMENT — PAIN SCALES - GENERAL
PAINLEVEL_OUTOF10: 9
PAINLEVEL_OUTOF10: 9

## 2024-02-07 ASSESSMENT — PAIN DESCRIPTION - ONSET: ONSET: ON-GOING

## 2024-02-07 NOTE — ED PROVIDER NOTES
[facility] EMERGENCY DEPARTMENT  EMERGENCY DEPARTMENT ENCOUNTER        Pt Name: Sulma Hernandez  MRN: [id number]  Birthdate 1980  Date of evaluation: 2024  Provider: PATT Flores - SERENA  PCP: Ollie Armenta MD  Note Started: 3:24 PM EST 24      {Shared Not Shared:80661}      CHIEF COMPLAINT       Chief Complaint   Patient presents with    Ankle Pain     Pt reports falling and tripping over \"1 step\" causing left ankle pain. No deformity noted in triage. Pt alert and oriented at this time with no signs of distress noted. Pt rates pain as a 9/10.       HISTORY OF PRESENT ILLNESS: 1 or more Elements     {History from (Optional):17081}    {Limitations to history (Optional):64822}    Sulma Hernandez is a 44 y.o. female who presents ***    Nursing Notes were all reviewed and agreed with or any disagreements were addressed in the HPI.    REVIEW OF SYSTEMS :      Review of Systems    Positives and Pertinent negatives as per HPI.     SURGICAL HISTORY     Past Surgical History:   Procedure Laterality Date     SECTION  13, 2016, 3/2022    Boy, Girl     SECTION N/A 2022     SECTION Low transverse uterine incision @ 1624 performed by Dorcas Lloyd MD at [facility] L&D OR    COLONOSCOPY      DILATION AND CURETTAGE OF UTERUS N/A 2021    DILATATION AND CURETTAGE SUCTION performed by Nicole Prater MD at [facility] L&D OR    WISDOM TOOTH EXTRACTION         CURRENTMEDICATIONS       Previous Medications    RIMEGEPANT SULFATE (NURTEC) 75 MG TBDP    Take 75 mg by mouth daily as needed (Migraine Headache)    SERTRALINE (ZOLOFT) 100 MG TABLET    TAKE 1 TABLET BY MOUTH DAILY    VALACYCLOVIR (VALTREX) 1 G TABLET    TAKE 2 TABLETS IMMEDIATELY AND THEN REPEAT IN 12 HOURS AT FIRST SIGN OF EACH COLD SORE OUTBREAK       ALLERGIES     Patient has no known allergies.    FAMILYHISTORY       Family History   Problem Relation Age of Onset    Other Mother  [facility] Emergency Department  [address] [facility] [address]  [phone]  Go to   As needed    Jovanny Foster MD  [facility] [place] [address]  [phone]    Call in 1 week  As needed, If symptoms worsen or fail to improve in this duration of time      DISCHARGE MEDICATIONS:  New Prescriptions    ACETAMINOPHEN (TYLENOL) 500 MG TABLET    Take 1 tablet by mouth 4 times daily as needed for Pain    IBUPROFEN (ADVIL;MOTRIN) 400 MG TABLET    Take 1 tablet by mouth 3 times daily as needed for Pain With meals       DISCONTINUED MEDICATIONS:  Discontinued Medications    No medications on file              (Please note that portions of this note were completed with a voice recognition program.  Efforts were made to edit the dictations but occasionally words are mis-transcribed.)    PATT Flores CNP (electronically signed)            Anuj Jenkins APRN - CNP  02/12/24 [id number]

## 2024-02-07 NOTE — DISCHARGE INSTRUCTIONS
Return to the emergency department for new or worsening symptoms including, limited to, developing change in color or temperature of your toes, loss of sensation in your foot/toes, other symptoms/concerns    Medications as prescribed.    Ensure that you eat prior to taking ibuprofen.    Utilize RICE.  Utilize the Ace wrap and your own ankle stirrup/brace and your own crutches for comfort.  Do not sleep in these devices.    Utilize the Ace wrap, brace, and the crutches for your comfort.    Follow-up with your primary care provider for reevaluation in next 1-2 days and with the orthopedic specialist-Dr. Jovanny Foster for symptoms that worsen or fail to improve in the next 1 week.

## (undated) DEVICE — GLOVE,SURG,SENSICARE SLT,LF,PF,7: Brand: MEDLINE

## (undated) DEVICE — CURETTE SURG VAC 11 MM CRV RIGID PLAS

## (undated) DEVICE — SUTURE VCRL SZ 3-0 L36IN ABSRB UD L36MM CT-1 1/2 CIR J944H

## (undated) DEVICE — STRIP,CLOSURE,WOUND,MEDI-STRIP,1/2X4: Brand: MEDLINE

## (undated) DEVICE — CATHETER,URETHRAL,VINYL,MALE,16",16 FR: Brand: MEDLINE

## (undated) DEVICE — SOLUTION IV IRRIG POUR BRL 0.9% SODIUM CHL 2F7124

## (undated) DEVICE — Z DISCONTINUED USE 2659139 CANNULA VAC DIA7MM STR SEMI RIG ROUNDED TIP DISP BERK

## (undated) DEVICE — BAG,SPONGE COUNTER,BLUE,50/BX,5BX/CS: Brand: MEDLINE

## (undated) DEVICE — Device

## (undated) DEVICE — AGENT HEMSTAT 3GM OXIDIZED REGENERATED CELOS ABSRB FOR CONT (ORDER MULTIPLES OF 5EA)

## (undated) DEVICE — HOSE CONN L18IN UTER DISP FOR BERK SAFETOUCH SYS

## (undated) DEVICE — GLOVE,SURG,SENSICARE SLT,LF,PF,7.5: Brand: MEDLINE

## (undated) DEVICE — SUTURE VCRL SZ 0 L36IN ABSRB UD CT-1 L36MM 1/2 CIR TAPR PNT VCP946H

## (undated) DEVICE — GLOVE,SURG,SENSICARE SLT,LF,PF,6.5: Brand: MEDLINE

## (undated) DEVICE — 9165 UNIVERSAL PATIENT PLATE: Brand: 3M™

## (undated) DEVICE — SET COLL TBNG L6FT DIA3/8IN W/ INTEGR SWVL HNDL SLIP RNG M

## (undated) DEVICE — PACK PROCEDURE SURG VAG DEL REV

## (undated) DEVICE — CANISTER, RIGID, 1200CC: Brand: MEDLINE INDUSTRIES, INC.

## (undated) DEVICE — Z DISCONTINUED USE 2659141 CANNULA VAC DIA9MM STR SEMI RIG ROUNDED TIP DISP BERK

## (undated) DEVICE — CANISTER, RIGID, 3000CC: Brand: MEDLINE INDUSTRIES, INC.

## (undated) DEVICE — POOLE SUCTION INSTRUMENT,RIGID: Brand: ARGYLE

## (undated) DEVICE — SUTURE VCRL SZ 4-0 L27IN ABSRB UD L60MM KS STR REV CUT NDL J662H

## (undated) DEVICE — CATHETER TRAY 16 FR 5 CC FOL ANTIREFLX SAMPLING PRT DOVER

## (undated) DEVICE — SAFESECURE,SECUREMENT,FOLEY CATH,STERILE: Brand: MEDLINE

## (undated) DEVICE — SPONGE LAP W18XL18IN WHT COT 4 PLY FLD STRUNG RADPQ DISP ST

## (undated) DEVICE — CHLORAPREP 26ML ORANGE

## (undated) DEVICE — COVER LT HNDL BLU PLAS

## (undated) DEVICE — Z DISCONTINUED USE 2659140 CANNULA VAC DIA8MM STR SEMI RIG ROUNDED TIP DISP BERK

## (undated) DEVICE — Z DISCONTINUED USE 2659136 CANNULA VAC DIA12MM CRV SEMI RIG W/ ROUNDED TIP TAPR END

## (undated) DEVICE — TRAP TISS DISP FOR COLL SYS BERK SAFETOUCH

## (undated) DEVICE — PAD,NON-ADHERENT,3X8,STERILE,LF,1/PK: Brand: MEDLINE

## (undated) DEVICE — Z DISCONTINUED USE 2659131 CANNULA VAC DIA7MM FLX ROUNDED TIP W/ 2 PRT HI QUAL DISP

## (undated) DEVICE — SOLUTION IV IRRIG WATER 500ML POUR BRL ST 2F7113

## (undated) DEVICE — TRAY SKIN PREP GELWITH 2 SPNG

## (undated) DEVICE — Z DISCONTINUED USE  2659134 CANNULA VAC DIA10MM CRV SEMI RIG W/ ROUNDED TIP TAPR END

## (undated) DEVICE — COVER US PRB W13XL244CM SURGICAL INTRAOPERATIVE W RUBBERBAND

## (undated) DEVICE — 3M™ STERI-STRIP™ COMPOUND BENZOIN TINCTURE 40 BAGS/CARTON 4 CARTONS/CASE C1544: Brand: 3M™ STERI-STRIP™

## (undated) DEVICE — PAD DRESSING TELFA OUCHLESS NONADH 3X8IN